# Patient Record
Sex: FEMALE | Race: BLACK OR AFRICAN AMERICAN | NOT HISPANIC OR LATINO | Employment: UNEMPLOYED | URBAN - METROPOLITAN AREA
[De-identification: names, ages, dates, MRNs, and addresses within clinical notes are randomized per-mention and may not be internally consistent; named-entity substitution may affect disease eponyms.]

---

## 2017-01-01 ENCOUNTER — HOSPITAL ENCOUNTER (EMERGENCY)
Facility: HOSPITAL | Age: 27
Discharge: HOME | End: 2017-01-02
Attending: EMERGENCY MEDICINE | Admitting: EMERGENCY MEDICINE
Payer: MEDICARE

## 2017-01-01 VITALS
RESPIRATION RATE: 18 BRPM | HEART RATE: 82 BPM | DIASTOLIC BLOOD PRESSURE: 72 MMHG | OXYGEN SATURATION: 98 % | TEMPERATURE: 99 F | HEIGHT: 63 IN | WEIGHT: 160 LBS | BODY MASS INDEX: 28.35 KG/M2 | SYSTOLIC BLOOD PRESSURE: 160 MMHG

## 2017-01-01 DIAGNOSIS — R10.30 LOWER ABDOMINAL PAIN: Primary | ICD-10-CM

## 2017-01-01 PROCEDURE — 82962 GLUCOSE BLOOD TEST: CPT

## 2017-01-01 PROCEDURE — 99284 EMERGENCY DEPT VISIT MOD MDM: CPT | Mod: ,,, | Performed by: EMERGENCY MEDICINE

## 2017-01-01 PROCEDURE — 99283 EMERGENCY DEPT VISIT LOW MDM: CPT | Mod: 25

## 2017-01-01 PROCEDURE — 25000003 PHARM REV CODE 250: Performed by: EMERGENCY MEDICINE

## 2017-01-01 RX ORDER — ONDANSETRON 4 MG/1
4 TABLET, ORALLY DISINTEGRATING ORAL
Status: COMPLETED | OUTPATIENT
Start: 2017-01-01 | End: 2017-01-01

## 2017-01-01 RX ORDER — OXYCODONE AND ACETAMINOPHEN 5; 325 MG/1; MG/1
1 TABLET ORAL
Status: COMPLETED | OUTPATIENT
Start: 2017-01-01 | End: 2017-01-01

## 2017-01-01 RX ADMIN — ONDANSETRON 4 MG: 4 TABLET, ORALLY DISINTEGRATING ORAL at 11:01

## 2017-01-01 RX ADMIN — OXYCODONE HYDROCHLORIDE AND ACETAMINOPHEN 1 TABLET: 5; 325 TABLET ORAL at 11:01

## 2017-01-01 NOTE — ED AVS SNAPSHOT
[facility]  [address]  [facility] [address]               Marylee Hill   2017 11:19 PM   ED    Description:  Female : 1990   Department:  [facility]           Your Care was Coordinated By:     Provider Role From To    Jacob Graham III, MD Attending Provider 17 [id number] --    Laina Chapa MD Resident 17 [phone] --      Reason for Visit     Abdominal Pain           Diagnoses this Visit        Comments    Lower abdominal pain    -  Primary       ED Disposition     None           To Do List           Follow-up Information     Follow up with [facility].    Specialty:  Emergency Medicine    Why:  As needed, If symptoms worsen    Contact information:    [address]  [facility] [id number] [phone]        Follow up with Keep your follow-up appointment with aleksandr gynecologist on 16 as scheduled.       These Medications        Disp Refills Start End    oxycodone-acetaminophen (PERCOCET) 5-325 mg per tablet 15 tablet 0 2017     Take 1 tablet by mouth every 4 (four) hours as needed for Pain. - Oral    ondansetron (ZOFRAN) 4 MG tablet 15 tablet 0 2017     Take 1 tablet (4 mg total) by mouth every 6 (six) hours as needed for Nausea. - Oral      [facility]     [facility] -  Assistance  Registered nurses in the [facility] provide clinical advisement, health education, appointment booking, and other advisory services.  Call for this free service at [phone].             Medications           Message regarding Medications     Verify the changes and/or additions to your medication regime listed below are the same as discussed with your clinician today.  If any of these changes or additions are incorrect, please notify your healthcare provider.        START taking these NEW medications        Refills    oxycodone-acetaminophen (PERCOCET) 5-325 mg  per tablet 0    Sig: Take 1 tablet by mouth every 4 (four) hours as needed for Pain.    Class: Print    Route: Oral    ondansetron (ZOFRAN) 4 MG tablet 0    Sig: Take 1 tablet (4 mg total) by mouth every 6 (six) hours as needed for Nausea.    Class: Print    Route: Oral      These medications were administered today        Dose Freq    oxycodone-acetaminophen 5-325 mg per tablet 1 tablet 1 tablet ED 1 Time    Sig: Take 1 tablet by mouth ED 1 Time.    Class: Normal    Route: Oral    ondansetron disintegrating tablet 4 mg 4 mg ED 1 Time    Sig: Take 1 tablet (4 mg total) by mouth ED 1 Time.    Class: Normal    Route: Oral      STOP taking these medications     tramadol (ULTRAM) 50 mg tablet Take 1 tablet (50 mg total) by mouth every 6 (six) hours as needed for Pain.    ondansetron (ZOFRAN-ODT) 4 MG TbDL Take 1 tablet (4 mg total) by mouth every 6 (six) hours as needed.           Verify that the below list of medications is an accurate representation of the medications you are currently taking.  If none reported, the list may be blank. If incorrect, please contact your healthcare provider. Carry this list with you in case of emergency.           Current Medications     atorvastatin (LIPITOR) 10 MG tablet Take 10 mg by mouth once daily.    insulin aspart protamine-insulin aspart (NOVOLOG 70/30) 100 unit/mL (70-30) InPn pen Inject 30 Units into the skin 2 (two) times daily before meals.    insulin glargine (LANTUS) 100 unit/mL injection Inject into the skin every evening.    ketorolac (TORADOL) 10 mg tablet Take 1 tablet (10 mg total) by mouth every 6 (six) hours.    LISINOPRIL ORAL Take 10 mg by mouth.     nitrofurantoin, macrocrystal-monohydrate, (MACROBID) 100 MG capsule Take 1 capsule (100 mg total) by mouth 2 (two) times daily.    ondansetron (ZOFRAN) 4 MG tablet Take 1 tablet (4 mg total) by mouth every 6 (six) hours as needed for Nausea.    oxycodone-acetaminophen (PERCOCET) 5-325 mg per tablet Take 1 tablet by  "mouth every 4 (four) hours as needed for Pain.    promethazine (PHENERGAN) 25 MG tablet Take 1 tablet (25 mg total) by mouth every 6 (six) hours as needed for Nausea.           Clinical Reference Information           Your Vitals Were     BP Pulse Temp Resp Height Weight    160/72 82 98.9 °F (37.2 °C) (Oral) 18 5' 3" (1.6 m) 72.6 kg (160 lb)    Last Period SpO2 BMI          12/30/2016 98% 28.34 kg/m2        Allergies as of 1/2/2017        Reactions    Ibuprofen     Vicodin [Hydrocodone-acetaminophen] Hives    Patient states that she can take percocet      Immunizations Administered on Date of Encounter - 1/2/2017     None      ED Micro, Lab, POCT     Start Ordered       Status Ordering Provider    01/02/17 0033 01/02/17 0032  C. trachomatis/N. gonorrhoeae by AMP DNA Cervix  Once      In process     01/02/17 0033 01/02/17 0032  Vaginal Screen Vagina  STAT      In process     01/01/17 2344 01/01/17 2343  POCT glucose  Once      Acknowledged     01/01/17 2336 01/01/17 2336  Urinalysis  STAT      In process     01/01/17 2336 01/01/17 2336  POCT urine pregnancy  Once      Acknowledged       ED Imaging Orders     None        Discharge Instructions         Abdominal Pain    Abdominal pain is pain in the stomach or belly area. Everyone has this pain from time to time. In many cases it goes away on its own. But abdominal pain can sometimes be due to a serious problem, such as appendicitis. So its important to know when to seek help.  Causes of abdominal pain  There are many possible causes of abdominal pain. Common causes in adults include:  · Constipation, diarrhea, or gas  · Stomach acid flowing back up into the esophagus (acid reflux or heartburn)  · Severe acid reflux, called GERD (gastroesophageal reflux disease)  · A sore in the lining of the stomach or small intestine (peptic ulcer)  · Inflammation of the gallbladder, liver, or pancreas  · Gallstones or kidney stones  · Appendicitis   · Intestinal blockage   · An " internal organ pushing through a muscle or other tissue (hernia)  · Urinary tract infections  · In women, menstrual cramps, fibroids, or endometriosis  · Inflammation or infection of the intestines  Diagnosing the cause of abdominal pain  Your healthcare provider will do a physical exam help find the cause of your pain. If needed, tests will be ordered. Belly pain has many possible causes. So it can be hard to find the reason for your pain. Giving details about your pain can help. Tell your provider where and when you feel the pain, and what makes it better or worse. Also let your provider know if you have other symptoms such as:  · Fever  · Tiredness  · Upset stomach (nausea)  · Vomiting  · Changes in bathroom habits  Treating abdominal pain  Some causes of pain need emergency medical treatment right away. These include appendicitis or a bowel blockage. Other problems can be treated with rest, fluids, or medicines. Your healthcare provider can give you specific instructions for treatment or self-care based on what is causing your pain.  If you have vomiting or diarrhea, sip water or other clear fluids. When you are ready to eat solid foods again, start with small amounts of easy-to-digest, low-fat foods. These include apple sauce, toast, or crackers.   When to seek medical care  Call 911 or go to the hospital right away if you:  · Cant pass stool and are vomiting  · Are vomiting blood or have bloody diarrhea or black, tarry diarrhea  · Have chest, neck, or shoulder pain  · Feel like you might pass out  · Have pain in your shoulder blades with nausea  · Have sudden, severe belly pain  · Have new, severe pain unlike any you have felt before  · Have a belly that is rigid, hard, and tender to touch  Call your healthcare provider if you have:  · Pain for more than 5 days  · Bloating for more than 2 days  · Diarrhea for more than 5 days  · A fever of 100.4°F (38.0°C) or higher, or as directed by your provider  · Pain  that gets worse  · Weight loss for no reason  · Continued lack of appetite  · Blood in your stool  How to prevent abdominal pain  Here are some tips to help prevent abdominal pain:  · Eat smaller amounts of food at one time.  · Avoid greasy, fried, or other high-fat foods.  · Avoid foods that give you gas.  · Exercise regularly.  · Drink plenty of fluids.  To help prevent GERD symptoms:  · Quit smoking.  · Reduce alcohol and certain foods that increase stomach acid.  · Avoid aspirin and over-the-counter pain and fever medicines (NSAIDS or nonsteroidal anti-inflammatory drugs), if possible  · Lose extra weight.  · Finish eating at least 2 hours before you go to bed or lie down.  · Raise the head of your bed.  © 2308-2906 [employer]. [address] [place] [address], [place] [address]. All rights reserved. This information is not intended as a substitute for professional medical care. Always follow your healthcare professional's instructions.          [facility] Sign-Up     Activating your [facility] account is as easy as 1-2-3!     1) Visit [email or web address], select Sign Up Now, enter this activation code and your date of birth, then select Next.  [email or web address]  Expires: 2/14/2017 12:47 AM      2) Create a username and password to use when you visit [facility] in the future and select a security question in case you lose your password and select Next.    3) Enter your e-mail address and click Sign Up!    Additional Information  If you have questions, please e-mail [email or web address] or call [phone] to talk to our [facility] staff. Remember, [facility] is NOT to be used for urgent needs. For medical emergencies, dial 911.          [facility] complies with applicable Federal civil rights laws and does not discriminate on the basis of race, color, national origin, age, disability, or sex.        Language Assistance Services     ATTENTION: Language assistance services are available, free of  charge. Please call [phone].      ATENCIÓN: Si habla español, tiene a ellis disposición servicios gratuitos de asistencia lingüística. Llame al [phone].     CHÚ Ý: N?u b?n nói Ti?ng Vi?t, có các d?ch v? h? tr? ngôn ng? mi?n phí dành cho b?n. G?i s? [phone].

## 2017-01-02 LAB
BACTERIA #/AREA URNS AUTO: NORMAL /HPF
BACTERIA GENITAL QL WET PREP: ABNORMAL
BILIRUB UR QL STRIP: NEGATIVE
CLARITY UR REFRACT.AUTO: CLEAR
CLUE CELLS VAG QL WET PREP: ABNORMAL
COLOR UR AUTO: YELLOW
FILAMENT FUNGI VAG WET PREP-#/AREA: ABNORMAL
GLUCOSE UR QL STRIP: NEGATIVE
HGB UR QL STRIP: NEGATIVE
KETONES UR QL STRIP: NEGATIVE
LEUKOCYTE ESTERASE UR QL STRIP: ABNORMAL
MICROSCOPIC COMMENT: NORMAL
NITRITE UR QL STRIP: NEGATIVE
PH UR STRIP: 7 [PH] (ref 5–8)
PROT UR QL STRIP: NEGATIVE
RBC #/AREA URNS AUTO: 1 /HPF (ref 0–4)
SP GR UR STRIP: 1.01 (ref 1–1.03)
SPECIMEN SOURCE: ABNORMAL
SQUAMOUS #/AREA URNS AUTO: 6 /HPF
T VAGINALIS GENITAL QL WET PREP: ABNORMAL
URN SPEC COLLECT METH UR: ABNORMAL
UROBILINOGEN UR STRIP-ACNC: NEGATIVE EU/DL
WBC #/AREA URNS AUTO: 3 /HPF (ref 0–5)
WBC #/AREA VAG WET PREP: ABNORMAL
YEAST GENITAL QL WET PREP: ABNORMAL

## 2017-01-02 PROCEDURE — 87591 N.GONORRHOEAE DNA AMP PROB: CPT

## 2017-01-02 PROCEDURE — 87210 SMEAR WET MOUNT SALINE/INK: CPT

## 2017-01-02 PROCEDURE — 81001 URINALYSIS AUTO W/SCOPE: CPT

## 2017-01-02 RX ORDER — ONDANSETRON 4 MG/1
4 TABLET, FILM COATED ORAL EVERY 6 HOURS PRN
Qty: 15 TABLET | Refills: 0 | Status: SHIPPED | OUTPATIENT
Start: 2017-01-02 | End: 2017-03-24

## 2017-01-02 RX ORDER — OXYCODONE AND ACETAMINOPHEN 5; 325 MG/1; MG/1
1 TABLET ORAL EVERY 4 HOURS PRN
Qty: 15 TABLET | Refills: 0 | Status: SHIPPED | OUTPATIENT
Start: 2017-01-02 | End: 2017-03-24

## 2017-01-02 NOTE — ED NOTES
Adult Physical Assessment  LOC: Marylee Hill, 26 y.o. female verified via two identifiers.  The patient is awake, alert, oriented and speaking appropriately at this time.  APPEARANCE: Patient resting comfortably and appears to be in no acute distress at this time. Patient is clean and well groomed, patient's clothing is properly fastened.  SKIN:The skin is warm and dry, color consistent with ethnicity, patient has normal skin turgor and moist mucus membranes, skin intact, no breakdown or brusing noted.  MUSCULOSKELETAL: Patient moving all extremities well, no obvious swelling or deformities noted.  RESPIRATORY: Airway is open and patent, respirations are spontaneous, patient has a normal effort and rate, no accessory muscle use noted.  CARDIAC: Patient has a normal rate and rhythm, no periphreal edema noted in any extremity, capillary refill < 3 seconds in all extremities  ABDOMEN: Soft and non tender to palpation, no abdominal distention noted. Bowel sounds present in all four quadrants.  NEUROLOGIC: Eyes open spontaneously, behavior appropriate to situation, follows commands, facial expression symmetrical, bilateral hand grasp equal and even, purposeful motor response noted, normal sensation in all extremities when touched with a finger.

## 2017-01-02 NOTE — ED PROVIDER NOTES
Encounter Date: 2017    SCRIBE #1 NOTE: I, Dasha Trammell, am scribing for, and in the presence of,  Dr. Graham. I have scribed the following portions of the note - the Resident attestation.       History     Chief Complaint   Patient presents with    Abdominal Pain     Reports lower abdominal pain that worsens with palpation. Reports N/V.     Review of patient's allergies indicates:   Allergen Reactions    Ibuprofen     Vicodin [hydrocodone-acetaminophen] Hives     Patient states that she can take percocet     HPI Comments: 26 y.o. F with DM, HTN who presents with left lower quadrant abdominal pain. She describes 6 months of intermittent LLQ abdominal pain that she describes as sharp, severe pain that feels like labor pain that is always in the same spot on her left lower abdomen. Pain was somewhat improved until starting her menstrual cycle about a week ago. The pain does occasionally radiate diffusely through the abdomen. Pain worse when she is menstruating, improves after her menstrual cycles though she does continue to have pain after her menses. Pain often associated with nausea and vomiting, though she is currently tolerating po without emesis. No fever/chills, possibly some dysuria. No hematuria, acute bowel changes, or vaginal symptoms    The history is provided by the patient.     Past Medical History   Diagnosis Date    Diabetes mellitus     Hyperlipidemia     Hypertension      No past medical history pertinent negatives.  Past Surgical History   Procedure Laterality Date     section, classic       History reviewed. No pertinent family history.  Social History   Substance Use Topics    Smoking status: Never Smoker    Smokeless tobacco: None    Alcohol use No     Review of Systems   Constitutional: Negative for chills and fever.   HENT: Negative for congestion and sore throat.    Respiratory: Negative for cough and shortness of breath.    Cardiovascular: Negative for chest pain and  leg swelling.   Gastrointestinal: Positive for abdominal pain, nausea and vomiting.   Genitourinary: Negative for flank pain, frequency, hematuria, vaginal discharge and vaginal pain.   Musculoskeletal: Negative for back pain and gait problem.   Skin: Negative for rash.   Neurological: Negative for weakness, light-headedness, numbness and headaches.   Hematological: Does not bruise/bleed easily.       Physical Exam   Initial Vitals   BP Pulse Resp Temp SpO2   01/01/17 2318 01/01/17 2318 01/01/17 2318 01/01/17 2318 01/01/17 2318   160/72 82 18 98.9 °F (37.2 °C) 98 %     Physical Exam    Nursing note and vitals reviewed.  Constitutional: She appears well-developed and well-nourished. She is not diaphoretic. No distress.   Appears comfortable, nontoxic   HENT:   Head: Normocephalic and atraumatic.   Mouth/Throat: Oropharynx is clear and moist.   Eyes: Conjunctivae and EOM are normal. No scleral icterus.   Neck: Normal range of motion. Neck supple. No tracheal deviation present.   Cardiovascular: Normal rate, regular rhythm, normal heart sounds and intact distal pulses. Exam reveals no gallop and no friction rub.    No murmur heard.  Pulmonary/Chest: Breath sounds normal. No respiratory distress. She has no wheezes. She has no rhonchi. She has no rales.   Abdominal: Soft. Bowel sounds are normal. She exhibits mass. She exhibits no distension. There is tenderness. There is no rebound and no guarding.   Small, firm mass palpated to left lower abdomen that is tender to palpation, pt states this is the source of her pain. No rebound, guarding  No CVA tenderness   Genitourinary:   Genitourinary Comments: External exam with no labial tenderness or external lesions. Speculum exam with no bleeding, discharge, or cervical friability.   No cervical motion tenderness, no adnexal tenderness, no adnexal masses or fullness.   Neurological: She is alert and oriented to person, place, and time. She has normal strength. No cranial nerve  deficit.   Skin: Skin is warm and dry.         ED Course   Procedures  Labs Reviewed   URINALYSIS - Abnormal; Notable for the following:        Result Value    Leukocytes, UA Trace (*)     All other components within normal limits   VAGINAL SCREEN - Abnormal; Notable for the following:     Clue Cells, Wet Prep Rare (*)     WBC - Vaginal Screen Rare (*)     Bacteria - Vaginal Screen Occasional (*)     All other components within normal limits   C. TRACHOMATIS/N. GONORRHOEAE BY AMP DNA   URINALYSIS MICROSCOPIC             Medical Decision Making:   History:   Old Medical Records: I decided to obtain old medical records.  Clinical Tests:   Lab Tests: Ordered and Reviewed  Radiological Study: Reviewed       APC / Resident Notes:   26 y.o. F here with acute exacerbation of chronic left lower abdominal pain.  Firm palpable mass on abdominal exam that appears to be source of patient's pain.  CT in October 2016 showed a 2.6cm hypodense lesion centered within the inferior left rectus abdominis muscle that may relate to a hematoma, desmoid, endometrial implant or less likely a malignant process.  Based on relationship to her menstrual cycles, pain possibly due to endometriosis, though scar tissue, tumor also considerations. No concern for SBO as abdomen soft, pt passing gas and having normal bowel movements.  She had labs done at [facility] two days ago that were normal.  Today will evaluate with UA, UPT, pelvic exam. Will treat here with percocet, zofran and reassess. I don't think that repeating her CT would be of much benefit as they recommended f/u evaluation with an MRI, which can be done as an outpatient.   Pt made aware of these CT results and need for outpatient f/u for further advanced imaging. She does have a PCP and gynecologist with whom she agrees to f/u. Anticipate discharge after workup/treatment.  Laina Chapa MD  12:00 AM         Scribe Attestation:   Scribe #1: I performed the above scribed  service and the documentation accurately describes the services I performed. I attest to the accuracy of the note.    Attending Attestation:   Physician Attestation Statement for Resident:  As the supervising MD   Physician Attestation Statement: I have personally seen and examined this patient.   I agree with the above history. -: Left lower quadrant abdominal pain    As the supervising MD I agree with the above PE.    As the supervising MD I agree with the above treatment, course, plan, and disposition.          Physician Attestation for Scribe:  Physician Attestation Statement for Scribe #1: I, Dr. Graham, reviewed documentation, as scribed by Dasha Trammell in my presence, and it is both accurate and complete.                 ED Course     Clinical Impression:   The encounter diagnosis was Lower abdominal pain.          Laina Chapa MD  Resident  01/02/17 0739       Jacob Graham III, MD  01/02/17 5599

## 2017-01-02 NOTE — DISCHARGE INSTRUCTIONS
Abdominal Pain    Abdominal pain is pain in the stomach or belly area. Everyone has this pain from time to time. In many cases it goes away on its own. But abdominal pain can sometimes be due to a serious problem, such as appendicitis. So its important to know when to seek help.  Causes of abdominal pain  There are many possible causes of abdominal pain. Common causes in adults include:  · Constipation, diarrhea, or gas  · Stomach acid flowing back up into the esophagus (acid reflux or heartburn)  · Severe acid reflux, called GERD (gastroesophageal reflux disease)  · A sore in the lining of the stomach or small intestine (peptic ulcer)  · Inflammation of the gallbladder, liver, or pancreas  · Gallstones or kidney stones  · Appendicitis   · Intestinal blockage   · An internal organ pushing through a muscle or other tissue (hernia)  · Urinary tract infections  · In women, menstrual cramps, fibroids, or endometriosis  · Inflammation or infection of the intestines  Diagnosing the cause of abdominal pain  Your healthcare provider will do a physical exam help find the cause of your pain. If needed, tests will be ordered. Belly pain has many possible causes. So it can be hard to find the reason for your pain. Giving details about your pain can help. Tell your provider where and when you feel the pain, and what makes it better or worse. Also let your provider know if you have other symptoms such as:  · Fever  · Tiredness  · Upset stomach (nausea)  · Vomiting  · Changes in bathroom habits  Treating abdominal pain  Some causes of pain need emergency medical treatment right away. These include appendicitis or a bowel blockage. Other problems can be treated with rest, fluids, or medicines. Your healthcare provider can give you specific instructions for treatment or self-care based on what is causing your pain.  If you have vomiting or diarrhea, sip water or other clear fluids. When you are ready to eat solid foods again,  start with small amounts of easy-to-digest, low-fat foods. These include apple sauce, toast, or crackers.   When to seek medical care  Call 911 or go to the hospital right away if you:  · Cant pass stool and are vomiting  · Are vomiting blood or have bloody diarrhea or black, tarry diarrhea  · Have chest, neck, or shoulder pain  · Feel like you might pass out  · Have pain in your shoulder blades with nausea  · Have sudden, severe belly pain  · Have new, severe pain unlike any you have felt before  · Have a belly that is rigid, hard, and tender to touch  Call your healthcare provider if you have:  · Pain for more than 5 days  · Bloating for more than 2 days  · Diarrhea for more than 5 days  · A fever of 100.4°F (38.0°C) or higher, or as directed by your provider  · Pain that gets worse  · Weight loss for no reason  · Continued lack of appetite  · Blood in your stool  How to prevent abdominal pain  Here are some tips to help prevent abdominal pain:  · Eat smaller amounts of food at one time.  · Avoid greasy, fried, or other high-fat foods.  · Avoid foods that give you gas.  · Exercise regularly.  · Drink plenty of fluids.  To help prevent GERD symptoms:  · Quit smoking.  · Reduce alcohol and certain foods that increase stomach acid.  · Avoid aspirin and over-the-counter pain and fever medicines (NSAIDS or nonsteroidal anti-inflammatory drugs), if possible  · Lose extra weight.  · Finish eating at least 2 hours before you go to bed or lie down.  · Raise the head of your bed.  © 5651-0910 [employer]. [address] [place] [address], [place] [address]. All rights reserved. This information is not intended as a substitute for professional medical care. Always follow your healthcare professional's instructions.

## 2017-01-02 NOTE — ED TRIAGE NOTES
Pt presents to the ed with abd pain that has been ongoing for the last six months. Pt reports she was seen as [facility] two days ago with similar symptoms. Pt reports her symptoms are worse around the time of her menstrual cycle and that nothing relieves her symptoms. Pt denies any nausea, vomiting, urinary symptoms or any other symptoms at this time

## 2017-01-03 LAB
C TRACH DNA SPEC QL NAA+PROBE: NEGATIVE
N GONORRHOEA DNA SPEC QL NAA+PROBE: NEGATIVE

## 2017-03-28 PROBLEM — Z79.4 TYPE 2 DIABETES MELLITUS WITHOUT COMPLICATION, WITH LONG-TERM CURRENT USE OF INSULIN: Status: ACTIVE | Noted: 2017-03-28

## 2017-03-28 PROBLEM — J06.9 ACUTE URI: Status: ACTIVE | Noted: 2017-03-28

## 2017-03-28 PROBLEM — E78.00 HYPERCHOLESTEREMIA: Status: ACTIVE | Noted: 2017-03-28

## 2017-03-28 PROBLEM — E11.9 TYPE 2 DIABETES MELLITUS WITHOUT COMPLICATION, WITH LONG-TERM CURRENT USE OF INSULIN: Status: ACTIVE | Noted: 2017-03-28

## 2017-03-28 PROBLEM — I10 ESSENTIAL HYPERTENSION: Status: ACTIVE | Noted: 2017-03-28

## 2017-03-28 PROBLEM — R19.04 LEFT LOWER QUADRANT ABDOMINAL WALL MASS: Status: ACTIVE | Noted: 2017-03-28

## 2017-04-06 PROBLEM — J06.9 ACUTE URI: Status: RESOLVED | Noted: 2017-03-28 | Resolved: 2017-04-06

## 2017-04-24 ENCOUNTER — HOSPITAL ENCOUNTER (OUTPATIENT)
Dept: RADIOLOGY | Facility: HOSPITAL | Age: 27
Discharge: HOME | End: 2017-04-24
Attending: SURGERY
Payer: MEDICARE

## 2017-04-24 DIAGNOSIS — Z79.4 TYPE 2 DIABETES MELLITUS WITHOUT COMPLICATION, WITH LONG-TERM CURRENT USE OF INSULIN: ICD-10-CM

## 2017-04-24 DIAGNOSIS — I10 ESSENTIAL HYPERTENSION: ICD-10-CM

## 2017-04-24 DIAGNOSIS — R19.04 LEFT LOWER QUADRANT ABDOMINAL WALL MASS: ICD-10-CM

## 2017-04-24 DIAGNOSIS — E11.9 TYPE 2 DIABETES MELLITUS WITHOUT COMPLICATION, WITH LONG-TERM CURRENT USE OF INSULIN: ICD-10-CM

## 2017-04-24 PROCEDURE — 76700 US EXAM ABDOM COMPLETE: CPT | Mod: 26,,, | Performed by: RADIOLOGY

## 2017-04-24 PROCEDURE — 76700 US EXAM ABDOM COMPLETE: CPT | Mod: TC

## 2017-05-01 PROBLEM — N80.129 ENDOMETRIOMA: Status: ACTIVE | Noted: 2017-05-01

## 2017-05-04 ENCOUNTER — ANESTHESIA EVENT (OUTPATIENT)
Dept: SURGERY | Facility: HOSPITAL | Age: 27
DRG: 750 | End: 2017-05-04
Payer: MEDICARE

## 2017-05-08 ENCOUNTER — ANESTHESIA (OUTPATIENT)
Dept: SURGERY | Facility: HOSPITAL | Age: 27
DRG: 750 | End: 2017-05-08
Payer: MEDICARE

## 2017-05-08 ENCOUNTER — HOSPITAL ENCOUNTER (INPATIENT)
Facility: HOSPITAL | Age: 27
LOS: 1 days | Discharge: HOME | DRG: 750 | End: 2017-05-08
Attending: SURGERY | Admitting: SURGERY
Payer: MEDICARE

## 2017-05-08 VITALS
DIASTOLIC BLOOD PRESSURE: 88 MMHG | SYSTOLIC BLOOD PRESSURE: 184 MMHG | RESPIRATION RATE: 16 BRPM | HEIGHT: 63 IN | OXYGEN SATURATION: 98 % | TEMPERATURE: 98 F | BODY MASS INDEX: 27.46 KG/M2 | WEIGHT: 155 LBS | HEART RATE: 58 BPM

## 2017-05-08 DIAGNOSIS — R19.04 LEFT LOWER QUADRANT ABDOMINAL WALL MASS: ICD-10-CM

## 2017-05-08 DIAGNOSIS — Z79.4 TYPE 2 DIABETES MELLITUS WITHOUT COMPLICATION, WITH LONG-TERM CURRENT USE OF INSULIN: ICD-10-CM

## 2017-05-08 DIAGNOSIS — R22.2 MASS OF ANTERIOR ABDOMINAL WALL: ICD-10-CM

## 2017-05-08 DIAGNOSIS — I10 ESSENTIAL HYPERTENSION: ICD-10-CM

## 2017-05-08 DIAGNOSIS — E11.9 TYPE 2 DIABETES MELLITUS WITHOUT COMPLICATION, WITH LONG-TERM CURRENT USE OF INSULIN: ICD-10-CM

## 2017-05-08 DIAGNOSIS — E78.00 HYPERCHOLESTEREMIA: ICD-10-CM

## 2017-05-08 DIAGNOSIS — N80.129 ENDOMETRIOMA: ICD-10-CM

## 2017-05-08 LAB
ANION GAP SERPL CALC-SCNC: 10 MMOL/L
ANISOCYTOSIS BLD QL SMEAR: SLIGHT
B-HCG UR QL: NEGATIVE
BASOPHILS # BLD AUTO: 0.04 K/UL
BASOPHILS NFR BLD: 0.7 %
BUN SERPL-MCNC: 6 MG/DL
CALCIUM SERPL-MCNC: 9 MG/DL
CHLORIDE SERPL-SCNC: 106 MMOL/L
CO2 SERPL-SCNC: 22 MMOL/L
CREAT SERPL-MCNC: 0.7 MG/DL
CTP QC/QA: YES
DIFFERENTIAL METHOD: ABNORMAL
EOSINOPHIL # BLD AUTO: 0.1 K/UL
EOSINOPHIL NFR BLD: 2 %
ERYTHROCYTE [DISTWIDTH] IN BLOOD BY AUTOMATED COUNT: 19.6 %
EST. GFR  (AFRICAN AMERICAN): >60 ML/MIN/1.73 M^2
EST. GFR  (NON AFRICAN AMERICAN): >60 ML/MIN/1.73 M^2
GLUCOSE SERPL-MCNC: 145 MG/DL
HCT VFR BLD AUTO: 32.9 %
HGB BLD-MCNC: 9.9 G/DL
HYPOCHROMIA BLD QL SMEAR: ABNORMAL
LYMPHOCYTES # BLD AUTO: 2.3 K/UL
LYMPHOCYTES NFR BLD: 42.6 %
MCH RBC QN AUTO: 20.3 PG
MCHC RBC AUTO-ENTMCNC: 30.1 %
MCV RBC AUTO: 68 FL
MONOCYTES # BLD AUTO: 0.5 K/UL
MONOCYTES NFR BLD: 8.7 %
NEUTROPHILS # BLD AUTO: 2.5 K/UL
NEUTROPHILS NFR BLD: 46 %
OVALOCYTES BLD QL SMEAR: ABNORMAL
PLATELET # BLD AUTO: 280 K/UL
PLATELET BLD QL SMEAR: ABNORMAL
PMV BLD AUTO: 9.1 FL
POCT GLUCOSE: 168 MG/DL (ref 70–110)
POIKILOCYTOSIS BLD QL SMEAR: SLIGHT
POLYCHROMASIA BLD QL SMEAR: ABNORMAL
POTASSIUM SERPL-SCNC: 3.7 MMOL/L
RBC # BLD AUTO: 4.87 M/UL
SODIUM SERPL-SCNC: 138 MMOL/L
WBC # BLD AUTO: 5.4 K/UL

## 2017-05-08 PROCEDURE — 25000003 PHARM REV CODE 250: Performed by: SURGERY

## 2017-05-08 PROCEDURE — 36000707: Performed by: SURGERY

## 2017-05-08 PROCEDURE — 12000002 HC ACUTE/MED SURGE SEMI-PRIVATE ROOM

## 2017-05-08 PROCEDURE — 63600175 PHARM REV CODE 636 W HCPCS: Performed by: ANESTHESIOLOGY

## 2017-05-08 PROCEDURE — 25000003 PHARM REV CODE 250: Performed by: NURSE ANESTHETIST, CERTIFIED REGISTERED

## 2017-05-08 PROCEDURE — 88307 TISSUE EXAM BY PATHOLOGIST: CPT | Mod: 26,,, | Performed by: PATHOLOGY

## 2017-05-08 PROCEDURE — 63600175 PHARM REV CODE 636 W HCPCS: Performed by: SURGERY

## 2017-05-08 PROCEDURE — 37000009 HC ANESTHESIA EA ADD 15 MINS: Performed by: SURGERY

## 2017-05-08 PROCEDURE — 37000008 HC ANESTHESIA 1ST 15 MINUTES: Performed by: SURGERY

## 2017-05-08 PROCEDURE — 71000039 HC RECOVERY, EACH ADD'L HOUR: Performed by: SURGERY

## 2017-05-08 PROCEDURE — 88307 TISSUE EXAM BY PATHOLOGIST: CPT | Performed by: PATHOLOGY

## 2017-05-08 PROCEDURE — 71000016 HC POSTOP RECOV ADDL HR: Performed by: SURGERY

## 2017-05-08 PROCEDURE — 80048 BASIC METABOLIC PNL TOTAL CA: CPT

## 2017-05-08 PROCEDURE — 85025 COMPLETE CBC W/AUTO DIFF WBC: CPT

## 2017-05-08 PROCEDURE — 63600175 PHARM REV CODE 636 W HCPCS: Performed by: NURSE ANESTHETIST, CERTIFIED REGISTERED

## 2017-05-08 PROCEDURE — 81025 URINE PREGNANCY TEST: CPT | Performed by: SURGERY

## 2017-05-08 PROCEDURE — 71000015 HC POSTOP RECOV 1ST HR: Performed by: SURGERY

## 2017-05-08 PROCEDURE — 36000706: Performed by: SURGERY

## 2017-05-08 PROCEDURE — 36415 COLL VENOUS BLD VENIPUNCTURE: CPT

## 2017-05-08 PROCEDURE — 0JB80ZZ EXCISION OF ABDOMEN SUBCUTANEOUS TISSUE AND FASCIA, OPEN APPROACH: ICD-10-PCS | Performed by: SURGERY

## 2017-05-08 PROCEDURE — 71000033 HC RECOVERY, INTIAL HOUR: Performed by: SURGERY

## 2017-05-08 RX ORDER — LISINOPRIL 10 MG/1
10 TABLET ORAL 2 TIMES DAILY
Status: ON HOLD | COMMUNITY
End: 2021-02-01

## 2017-05-08 RX ORDER — PHENYLEPHRINE HYDROCHLORIDE 10 MG/ML
INJECTION INTRAVENOUS
Status: DISCONTINUED | OUTPATIENT
Start: 2017-05-08 | End: 2017-05-08

## 2017-05-08 RX ORDER — SODIUM CHLORIDE, SODIUM LACTATE, POTASSIUM CHLORIDE, CALCIUM CHLORIDE 600; 310; 30; 20 MG/100ML; MG/100ML; MG/100ML; MG/100ML
125 INJECTION, SOLUTION INTRAVENOUS CONTINUOUS
Status: ACTIVE | OUTPATIENT
Start: 2017-05-08 | End: 2017-05-08

## 2017-05-08 RX ORDER — SODIUM CHLORIDE, SODIUM LACTATE, POTASSIUM CHLORIDE, CALCIUM CHLORIDE 600; 310; 30; 20 MG/100ML; MG/100ML; MG/100ML; MG/100ML
INJECTION, SOLUTION INTRAVENOUS CONTINUOUS PRN
Status: DISCONTINUED | OUTPATIENT
Start: 2017-05-08 | End: 2017-05-08

## 2017-05-08 RX ORDER — OXYCODONE AND ACETAMINOPHEN 5; 325 MG/1; MG/1
1 TABLET ORAL EVERY 4 HOURS PRN
Status: DISCONTINUED | OUTPATIENT
Start: 2017-05-08 | End: 2017-05-08 | Stop reason: HOSPADM

## 2017-05-08 RX ORDER — ACETAMINOPHEN 10 MG/ML
INJECTION, SOLUTION INTRAVENOUS
Status: DISCONTINUED | OUTPATIENT
Start: 2017-05-08 | End: 2017-05-08

## 2017-05-08 RX ORDER — LIDOCAINE HYDROCHLORIDE 10 MG/ML
INJECTION, SOLUTION EPIDURAL; INFILTRATION; INTRACAUDAL; PERINEURAL
Status: DISCONTINUED | OUTPATIENT
Start: 2017-05-08 | End: 2017-05-08 | Stop reason: HOSPADM

## 2017-05-08 RX ORDER — ONDANSETRON 2 MG/ML
4 INJECTION INTRAMUSCULAR; INTRAVENOUS DAILY PRN
Status: DISCONTINUED | OUTPATIENT
Start: 2017-05-08 | End: 2017-05-08 | Stop reason: HOSPADM

## 2017-05-08 RX ORDER — BACITRACIN 50000 [IU]/1
INJECTION, POWDER, FOR SOLUTION INTRAMUSCULAR
Status: DISCONTINUED | OUTPATIENT
Start: 2017-05-08 | End: 2017-05-08 | Stop reason: HOSPADM

## 2017-05-08 RX ORDER — ONDANSETRON HYDROCHLORIDE 2 MG/ML
INJECTION, SOLUTION INTRAMUSCULAR; INTRAVENOUS
Status: DISCONTINUED | OUTPATIENT
Start: 2017-05-08 | End: 2017-05-08

## 2017-05-08 RX ORDER — SODIUM CHLORIDE 0.9 % (FLUSH) 0.9 %
3 SYRINGE (ML) INJECTION
Status: DISCONTINUED | OUTPATIENT
Start: 2017-05-08 | End: 2017-05-08 | Stop reason: HOSPADM

## 2017-05-08 RX ORDER — SODIUM CHLORIDE 9 MG/ML
INJECTION, SOLUTION INTRAVENOUS CONTINUOUS
Status: DISCONTINUED | OUTPATIENT
Start: 2017-05-08 | End: 2017-05-08 | Stop reason: HOSPADM

## 2017-05-08 RX ORDER — HYDROMORPHONE HYDROCHLORIDE 2 MG/ML
0.4 INJECTION, SOLUTION INTRAMUSCULAR; INTRAVENOUS; SUBCUTANEOUS EVERY 5 MIN PRN
Status: DISPENSED | OUTPATIENT
Start: 2017-05-08 | End: 2017-05-08

## 2017-05-08 RX ORDER — GLYCOPYRROLATE 0.2 MG/ML
INJECTION INTRAMUSCULAR; INTRAVENOUS
Status: DISCONTINUED | OUTPATIENT
Start: 2017-05-08 | End: 2017-05-08

## 2017-05-08 RX ORDER — PROPOFOL 10 MG/ML
VIAL (ML) INTRAVENOUS
Status: DISCONTINUED | OUTPATIENT
Start: 2017-05-08 | End: 2017-05-08

## 2017-05-08 RX ORDER — SUCCINYLCHOLINE CHLORIDE 20 MG/ML
INJECTION INTRAMUSCULAR; INTRAVENOUS
Status: DISCONTINUED | OUTPATIENT
Start: 2017-05-08 | End: 2017-05-08

## 2017-05-08 RX ORDER — LIDOCAINE HCL/PF 100 MG/5ML
SYRINGE (ML) INTRAVENOUS
Status: DISCONTINUED | OUTPATIENT
Start: 2017-05-08 | End: 2017-05-08

## 2017-05-08 RX ORDER — CEFAZOLIN SODIUM 2 G/50ML
2 SOLUTION INTRAVENOUS
Status: COMPLETED | OUTPATIENT
Start: 2017-05-08 | End: 2017-05-08

## 2017-05-08 RX ORDER — FENTANYL CITRATE 50 UG/ML
INJECTION, SOLUTION INTRAMUSCULAR; INTRAVENOUS
Status: DISCONTINUED | OUTPATIENT
Start: 2017-05-08 | End: 2017-05-08

## 2017-05-08 RX ORDER — NEOSTIGMINE METHYLSULFATE 1 MG/ML
INJECTION, SOLUTION INTRAVENOUS
Status: DISCONTINUED | OUTPATIENT
Start: 2017-05-08 | End: 2017-05-08

## 2017-05-08 RX ORDER — ONDANSETRON 4 MG/1
8 TABLET, ORALLY DISINTEGRATING ORAL EVERY 8 HOURS PRN
Qty: 10 TABLET | Refills: 1 | Status: SHIPPED | OUTPATIENT
Start: 2017-05-08 | End: 2017-05-10

## 2017-05-08 RX ORDER — OXYCODONE HYDROCHLORIDE 5 MG/1
5 TABLET ORAL EVERY 4 HOURS PRN
Qty: 24 TABLET | Refills: 0 | Status: SHIPPED | OUTPATIENT
Start: 2017-05-08 | End: 2017-05-10 | Stop reason: SINTOL

## 2017-05-08 RX ORDER — HYDROMORPHONE HYDROCHLORIDE 2 MG/ML
0.2 INJECTION, SOLUTION INTRAMUSCULAR; INTRAVENOUS; SUBCUTANEOUS EVERY 5 MIN PRN
Status: ACTIVE | OUTPATIENT
Start: 2017-05-08 | End: 2017-05-08

## 2017-05-08 RX ORDER — ROCURONIUM BROMIDE 10 MG/ML
INJECTION, SOLUTION INTRAVENOUS
Status: DISCONTINUED | OUTPATIENT
Start: 2017-05-08 | End: 2017-05-08

## 2017-05-08 RX ORDER — MIDAZOLAM HYDROCHLORIDE 1 MG/ML
INJECTION INTRAMUSCULAR; INTRAVENOUS
Status: DISCONTINUED | OUTPATIENT
Start: 2017-05-08 | End: 2017-05-08

## 2017-05-08 RX ADMIN — HYDROMORPHONE HYDROCHLORIDE 0.4 MG: 2 INJECTION INTRAMUSCULAR; INTRAVENOUS; SUBCUTANEOUS at 12:05

## 2017-05-08 RX ADMIN — MIDAZOLAM HYDROCHLORIDE 2 MG: 1 INJECTION, SOLUTION INTRAMUSCULAR; INTRAVENOUS at 10:05

## 2017-05-08 RX ADMIN — FENTANYL CITRATE 50 MCG: 50 INJECTION, SOLUTION INTRAMUSCULAR; INTRAVENOUS at 11:05

## 2017-05-08 RX ADMIN — FENTANYL CITRATE 25 MCG: 50 INJECTION, SOLUTION INTRAMUSCULAR; INTRAVENOUS at 11:05

## 2017-05-08 RX ADMIN — ONDANSETRON 4 MG: 2 INJECTION, SOLUTION INTRAMUSCULAR; INTRAVENOUS at 11:05

## 2017-05-08 RX ADMIN — FENTANYL CITRATE 50 MCG: 50 INJECTION, SOLUTION INTRAMUSCULAR; INTRAVENOUS at 10:05

## 2017-05-08 RX ADMIN — OXYCODONE AND ACETAMINOPHEN 1 TABLET: 5; 325 TABLET ORAL at 01:05

## 2017-05-08 RX ADMIN — SODIUM CHLORIDE, SODIUM LACTATE, POTASSIUM CHLORIDE, AND CALCIUM CHLORIDE: .6; .31; .03; .02 INJECTION, SOLUTION INTRAVENOUS at 10:05

## 2017-05-08 RX ADMIN — LIDOCAINE HYDROCHLORIDE 80 MG: 20 INJECTION, SOLUTION INTRAVENOUS at 10:05

## 2017-05-08 RX ADMIN — ACETAMINOPHEN 1000 MG: 10 INJECTION, SOLUTION INTRAVENOUS at 10:05

## 2017-05-08 RX ADMIN — FENTANYL CITRATE 150 MCG: 50 INJECTION, SOLUTION INTRAMUSCULAR; INTRAVENOUS at 10:05

## 2017-05-08 RX ADMIN — PHENYLEPHRINE HYDROCHLORIDE 100 MCG: 10 INJECTION INTRAVENOUS at 10:05

## 2017-05-08 RX ADMIN — GLYCOPYRROLATE 0.6 MG: 0.2 INJECTION, SOLUTION INTRAMUSCULAR; INTRAVENOUS at 11:05

## 2017-05-08 RX ADMIN — PROPOFOL 150 MG: 10 INJECTION, EMULSION INTRAVENOUS at 10:05

## 2017-05-08 RX ADMIN — ROCURONIUM BROMIDE 20 MG: 10 INJECTION, SOLUTION INTRAVENOUS at 10:05

## 2017-05-08 RX ADMIN — NEOSTIGMINE METHYLSULFATE 3 MG: 1 INJECTION INTRAVENOUS at 11:05

## 2017-05-08 RX ADMIN — ROCURONIUM BROMIDE 5 MG: 10 INJECTION, SOLUTION INTRAVENOUS at 10:05

## 2017-05-08 RX ADMIN — ONDANSETRON 4 MG: 2 INJECTION INTRAMUSCULAR; INTRAVENOUS at 02:05

## 2017-05-08 RX ADMIN — CEFAZOLIN SODIUM 2 G: 2 SOLUTION INTRAVENOUS at 10:05

## 2017-05-08 RX ADMIN — SUCCINYLCHOLINE CHLORIDE 100 MG: 20 INJECTION, SOLUTION INTRAMUSCULAR; INTRAVENOUS at 10:05

## 2017-05-08 NOTE — IP AVS SNAPSHOT
[facility] [place]  [address]  [place] [address]  Phone: [phone]           Patient Discharge Instructions   Our goal is to set you up for success. This packet includes information on your condition, medications, and your home care.  It will help you care for yourself to prevent having to return to the hospital.     Please ask your nurse if you have any questions.      There are many details to remember when preparing to leave the hospital. Here is what you will need to do:    1. Take your medicine. If you are prescribed medications, review your Medication List on the following pages. You may have new medications to  at the pharmacy and others that you'll need to stop taking. Review the instructions for how and when to take your medications. Talk with your doctor or nurses if you are unsure of what to do.     2. Go to your follow-up appointments. Specific follow-up information is listed in the following pages. Your may be contacted by a nurse or clinical provider about future appointments. Be sure we have all of the phone numbers to reach you. Please contact your provider's office if you are unable to make an appointment.     3. Watch for warning signs. Your doctor or nurse will give you detailed warning signs to watch for and when to call for assistance. These instructions may also include educational information about your condition. If you experience any of warning signs to your health, call your doctor.               ** Verify the list of medication(s) below is accurate and up to date. Carry this with you in case of emergency. If your medications have changed, please notify your healthcare provider.             Medication List      START taking these medications        Additional Info                      ondansetron 4 MG Tbdl   Commonly known as:  ZOFRAN-ODT   Quantity:  10 tablet   Refills:  1   Dose:  8 mg    Instructions:  Take 2 tablets (8 mg total) by mouth every 8 (eight) hours  as needed.     Begin Date    AM    Noon    PM    Bedtime       oxycodone 5 MG immediate release tablet   Commonly known as:  ROXICODONE   Quantity:  24 tablet   Refills:  0   Dose:  5 mg    Instructions:  Take 1 tablet (5 mg total) by mouth every 4 (four) hours as needed for Pain.     Begin Date    AM    Noon    PM    Bedtime         CONTINUE taking these medications        Additional Info                      atorvastatin 10 MG tablet   Commonly known as:  LIPITOR   Refills:  0   Dose:  10 mg    Instructions:  Take 10 mg by mouth once daily.     Begin Date    AM    Noon    PM    Bedtime       insulin aspart protamine-insulin aspart 100 unit/mL (70-30) Inpn pen   Commonly known as:  NovoLOG 70/30   Refills:  0   Dose:  35 Units    Instructions:  Inject 35 Units into the skin 2 (two) times daily before meals.     Begin Date    AM    Noon    PM    Bedtime       lisinopril 10 MG tablet   Refills:  0   Dose:  10 mg    Instructions:  Take 10 mg by mouth 2 (two) times daily.     Begin Date    AM    Noon    PM    Bedtime       ondansetron 8 MG tablet   Commonly known as:  ZOFRAN   Quantity:  20 tablet   Refills:  2   Dose:  8 mg    Instructions:  Take 1 tablet (8 mg total) by mouth every 8 (eight) hours as needed for Nausea.     Begin Date    AM    Noon    PM    Bedtime       oxycodone-acetaminophen 5-325 mg per tablet   Commonly known as:  PERCOCET   Quantity:  10 tablet   Refills:  0   Dose:  1 tablet    Last time this was given:  1 tablet on 5/8/2017  1:22 PM   Instructions:  Take 1 tablet by mouth every 8 (eight) hours as needed for Pain.     Begin Date    AM    Noon    PM    Bedtime         STOP taking these medications     insulin glargine 100 unit/mL injection   Commonly known as:  LANTUS            Where to Get Your Medications      You can get these medications from any pharmacy     Bring a paper prescription for each of these medications     ondansetron 4 MG Tbdl    oxycodone 5 MG immediate release tablet                   Please bring to all follow up appointments:    1. A copy of your discharge instructions.  2. All medicines you are currently taking in their original bottles.  3. Identification and insurance card.    Please arrive 15 minutes ahead of scheduled appointment time.    Please call 24 hours in advance if you must reschedule your appointment and/or time.        Your Scheduled Appointments     May 16, 2017 10:00 AM CDT   Established Patient Visit with [facility] [place] [facility]    [address] [place] [address] [phone]              Follow-up Information     Follow up with Monica Ortega MD In 1 week.    Specialties:  General Surgery, Surgery    Contact information:    [address]  [address]  [place] [address] [phone]          Discharge Instructions     Future Orders    Activity as tolerated     Call MD for:  persistent nausea and vomiting     Call MD for:  redness, tenderness, or signs of infection (pain, swelling, redness, odor or green/yellow discharge around incision site)     Call MD for:  severe uncontrolled pain     Call MD for:  temperature >100.4     Diet general     Questions:    Total calories:      Fat restriction, if any:      Protein restriction, if any:      Na restriction, if any:      Fluid restriction:      Additional restrictions:      Leave dressing on - Keep it clean, dry, and intact until clinic visit     Lifting restrictions         Discharge Instructions       DIET: You may resume your home diet. If nausea is present, increase your diet gradually with fluids and bland foods    ACTIVITY LEVEL: If you have received sedation or an anesthetic, you may feel sleepy for several hours. Rest until you are more awake. Gradually resume your normal activities    Medications: Pain medication should be taken only if needed and as directed. If antibiotics are prescribed, the medication should be taken until completed.  You will be given an updated list of you medications.    No driving, alcoholic beverages or signing legal documents for next 24 hours or while taking pain medication.       CALL THE DOCTOR:    For any obvious bleeding (some dried blood over the incision is normal).      Redness, swelling, foul smell around incision or fever over 101.   Shortness of breath, Coughing up Bloody sputum, Pains or Swelling in your Calves .   Persistent pain or nausea not relieved by medication.    If any unusual problems or difficulties occur contact your doctor. If you cannot contact your doctor but feel your signs and symptoms warrant a physicians attention return to the emergency room.            Keep Abdominal Dressing Clean Dry and Intact until clinic visit with Dr. Ortega in one week. Be carefull not to get it soiled.    May shower with dressing on. Pat dry being careful not to dislodge the dressing.    Don't lift anything heavy or do anything strenuous until cleared by Dr. Ortega.          Acetaminophen; Oxycodone tablets  What is this medicine?  ACETAMINOPHEN; OXYCODONE (a set a NAV tereso fen; ox i KOE done) is a pain reliever. It is used to treat moderate to severe pain.  How should I use this medicine?  Take this medicine by mouth with a full glass of water. Follow the directions on the prescription label. You can take it with or without food. If it upsets your stomach, take it with food. Take your medicine at regular intervals. Do not take it more often than directed.  A special MedGuide will be given to you by the pharmacist with each prescription and refill. Be sure to read this information carefully each time.  Talk to your pediatrician regarding the use of this medicine in children. Special care may be needed.  What side effects may I notice from receiving this medicine?  Side effects that you should report to your doctor or health care professional as soon as possible:  · allergic reactions like skin rash, itching or  hives, swelling of the face, lips, or tongue  · breathing problems  · confusion  · redness, blistering, peeling or loosening of the skin, including inside the mouth  · signs and symptoms of liver injury like dark yellow or brown urine; general ill feeling or flu-like symptoms; light-colored stools; loss of appetite; nausea; right upper belly pain; unusually weak or tired; yellowing of the eyes or skin  · signs and symptoms of low blood pressure like dizziness; feeling faint or lightheaded, falls; unusually weak or tired  · trouble passing urine or change in the amount of urine  Side effects that usually do not require medical attention (report to your doctor or health care professional if they continue or are bothersome):  · constipation  · dry mouth  · nausea, vomiting  · tiredness  What may interact with this medicine?  This medicine may interact with the following medications:  · alcohol  · antihistamines for allergy, cough and cold  · antiviral medicines for HIV or AIDS  · atropine  · certain antibiotics like clarithromycin, erythromycin, linezolid, rifampin  · certain medicines for anxiety or sleep  · certain medicines for bladder problems like oxybutynin, tolterodine  · certain medicines for depression like amitriptyline, fluoxetine, sertraline  · certain medicines for fungal infections like ketoconazole, itraconazole, voriconazole  · certain medicines for migraine headache like almotriptan, eletriptan, frovatriptan, naratriptan, rizatriptan, sumatriptan, zolmitriptan  · certain medicines for nausea or vomiting like dolasetron, ondansetron, palonosetron  · certain medicines for Parkinson's disease like benztropine, trihexyphenidyl  · certain medicines for seizures like phenobarbital, phenytoin, primidone  · certain medicines for stomach problems like dicyclomine, hyoscyamine  · certain medicines for travel sickness like scopolamine  · diuretics  · general anesthetics like halothane, isoflurane, methoxyflurane,  propofol  · ipratropium  · local anesthetics like lidocaine, pramoxine, tetracaine  · MAOIs like Carbex, Eldepryl, Marplan, Nardil, and Parnate  · medicines that relax muscles for surgery  · methylene blue  · nilotinib  · other medicines with acetaminophen  · other narcotic medicines for pain or cough  · phenothiazines like chlorpromazine, mesoridazine, prochlorperazine, thioridazine  What if I miss a dose?  If you miss a dose, take it as soon as you can. If it is almost time for your next dose, take only that dose. Do not take double or extra doses.  Where should I keep my medicine?  Keep out of the reach of children. This medicine can be abused. Keep your medicine in a safe place to protect it from theft. Do not share this medicine with anyone. Selling or giving away this medicine is dangerous and against the law.  This medicine may cause accidental overdose and death if it taken by other adults, children, or pets. Mix any unused medicine with a substance like cat litter or coffee grounds. Then throw the medicine away in a sealed container like a sealed bag or a coffee can with a lid. Do not use the medicine after the expiration date.  Store at room temperature between 20 and 25 degrees C (68 and 77 degrees F).  What should I tell my health care provider before I take this medicine?  They need to know if you have any of these conditions:  · brain tumor  · Crohn's disease, inflammatory bowel disease, or ulcerative colitis  · drug abuse or addiction  · head injury  · heart or circulation problems  · if you often drink alcohol  · kidney disease or problems going to the bathroom  · liver disease  · lung disease, asthma, or breathing problems  · an unusual or allergic reaction to acetaminophen, oxycodone, other opioid analgesics, other medicines, foods, dyes, or preservatives  · pregnant or trying to get pregnant  · breast-feeding  What should I watch for while using this medicine?  Tell your doctor or health care  professional if your pain does not go away, if it gets worse, or if you have new or a different type of pain. You may develop tolerance to the medicine. Tolerance means that you will need a higher dose of the medication for pain relief. Tolerance is normal and is expected if you take this medicine for a long time.  Do not suddenly stop taking your medicine because you may develop a severe reaction. Your body becomes used to the medicine. This does NOT mean you are addicted. Addiction is a behavior related to getting and using a drug for a non-medical reason. If you have pain, you have a medical reason to take pain medicine. Your doctor will tell you how much medicine to take. If your doctor wants you to stop the medicine, the dose will be slowly lowered over time to avoid any side effects.  There are different types of narcotic medicines (opiates). If you take more than one type at the same time or if you are taking another medicine that also causes drowsiness, you may have more side effects. Give your health care provider a list of all medicines you use. Your doctor will tell you how much medicine to take. Do not take more medicine than directed. Call emergency for help if you have problems breathing or unusual sleepiness.  Do not take other medicines that contain acetaminophen with this medicine. Always read labels carefully. If you have questions, ask your doctor or pharmacist.  If you take too much acetaminophen get medical help right away. Too much acetaminophen can be very dangerous and cause liver damage. Even if you do not have symptoms, it is important to get help right away.  You may get drowsy or dizzy. Do not drive, use machinery, or do anything that needs mental alertness until you know how this medicine affects you. Do not stand or sit up quickly, especially if you are an older patient. This reduces the risk of dizzy or fainting spells. Alcohol may interfere with the effect of this medicine. Avoid  alcoholic drinks.  The medicine will cause constipation. Try to have a bowel movement at least every 2 to 3 days. If you do not have a bowel movement for 3 days, call your doctor or health care professional.  Your mouth may get dry. Chewing sugarless gum or sucking hard candy, and drinking plenty or water may help. Contact your doctor if the problem does not go away or is severe.  Date Last Reviewed:   NOTE:This sheet is a summary. It may not cover all possible information. If you have questions about this medicine, talk to your doctor, pharmacist, or health care provider. Copyright© 2016 [employer]          Ondansetron tablets  What is this medicine?  ONDANSETRON (on DAN se mango) is used to treat nausea and vomiting caused by chemotherapy. It is also used to prevent or treat nausea and vomiting after surgery.  How should I use this medicine?  Take this medicine by mouth with a glass of water. Follow the directions on your prescription label. Take your doses at regular intervals. Do not take your medicine more often than directed.  Talk to your pediatrician regarding the use of this medicine in children. Special care may be needed.  What side effects may I notice from receiving this medicine?  Side effects that you should report to your doctor or health care professional as soon as possible:  · allergic reactions like skin rash, itching or hives, swelling of the face, lips or tongue  · breathing problems  · confusion  · dizziness  · fast or irregular heartbeat  · feeling faint or lightheaded, falls  · fever and chills  · loss of balance or coordination  · seizures  · sweating  · swelling of the hands or feet  · tightness in the chest  · tremors  · unusually weak or tired  Side effects that usually do not require medical attention (report to your doctor or health care professional if they continue or are bothersome):  · constipation or diarrhea  · headache  What may interact with this medicine?  Do not take this  medicine with any of the following medications:  · apomorphine  · certain medicines for fungal infections like fluconazole, itraconazole, ketoconazole, posaconazole, voriconazole  · cisapride  · dofetilide  · dronedarone  · pimozide  · thioridazine  · ziprasidone  This medicine may also interact with the following medications:  · carbamazepine  · certain medicines for depression, anxiety, or psychotic disturbances  · fentanyl  · linezolid  · MAOIs like Carbex, Eldepryl, Marplan, Nardil, and Parnate  · methylene blue (injected into a vein)  · other medicines that prolong the QT interval (cause an abnormal heart rhythm)  · phenytoin  · rifampicin  · tramadol  What if I miss a dose?  If you miss a dose, take it as soon as you can. If it is almost time for your next dose, take only that dose. Do not take double or extra doses.  Where should I keep my medicine?  Keep out of the reach of children.  Store between 2 and 30 degrees C (36 and 86 degrees F). Throw away any unused medicine after the expiration date.  What should I tell my health care provider before I take this medicine?  They need to know if you have any of these conditions:  · heart disease  · history of irregular heartbeat  · liver disease  · low levels of magnesium or potassium in the blood  · an unusual or allergic reaction to ondansetron, granisetron, other medicines, foods, dyes, or preservatives  · pregnant or trying to get pregnant  · breast-feeding  What should I watch for while using this medicine?  Check with your doctor or health care professional right away if you have any sign of an allergic reaction.  Date Last Reviewed:   NOTE:This sheet is a summary. It may not cover all possible information. If you have questions about this medicine, talk to your doctor, pharmacist, or health care provider. Copyright© 2016 [employer]              Primary Diagnosis     Your primary diagnosis was:  Mass Of Anterior Abdominal Wall      Admission Information   "   Date & Time Provider Department CSN    5/8/2017  8:47 AM Monica Ortega MD [facility] [id number]      Care Providers     Provider Role Specialty Primary office phone    Monica Ortega MD Attending Provider General Surgery [phone]    Ximena Sheikh MD Consulting Physician  Anesthesiology [phone]    Monica Ortega MD Surgeon  General Surgery [phone]      Your Vitals Were     BP Pulse Temp Resp Height Weight    169/88 62 98.4 °F (36.9 °C) (Oral) 16 5' 3" (1.6 m) 70.3 kg (155 lb)    Last Period SpO2 BMI          04/13/2017 (Approximate) 99% 27.46 kg/m2        Recent Lab Values     No lab values to display.      Pending Labs     Order Current Status    Specimen to Pathology - Surgery In process      Allergies as of 5/8/2017        Reactions    Ibuprofen     Vicodin [Hydrocodone-acetaminophen] Hives    Patient states that she can take percocet      [facility]     [facility] Nurse Care Line - 24/7 Assistance  Unless otherwise directed by your provider, please contact [facility], our nurse care line that is available for 24/7 assistance.     Registered nurses in the [facility] provide clinical advisement, health education, appointment booking, and other advisory services.  Call for this free service at [phone].        Advance Directives     An advance directive is a document which, in the event you are no longer able to make decisions for yourself, tells your healthcare team what kind of treatment you do or do not want to receive, or who you would like to make those decisions for you.  If you do not currently have an advance directive, [facility] encourages you to create one.  For more information call:  [phone]), [phone] ([phone]),  or log on to [email or web address].        Language Assistance Services     ATTENTION: Language assistance services are available, free of charge. Please call [phone].  "     ATENCIÓN: Si habla marii, tiene a ellis disposición servicios gratuitos de asistencia lingüística. Llame al [phone].     [facility] Ý: N?u b?n nói Ti?ng Vi?t, có các d?ch v? h? tr? ngôn ng? mi?n phí dành cho b?n. G?i s? [phone].        Diabetes Discharge Instructions                                   [facility] Sign-Up     Activating your [facility] account is as easy as 1-2-3!     1) Visit [email or web address], select Sign Up Now, enter this activation code and your date of birth, then select Next.  [email or web address]  Expires: 5/8/2017  5:25 PM      2) Create a username and password to use when you visit [facility] in the future and select a security question in case you lose your password and select Next.    3) Enter your e-mail address and click Sign Up!    Additional Information  If you have questions, please e-mail [email or web address] or call [phone] to talk to our [facility] staff. Remember, [facility] is NOT to be used for urgent needs. For medical emergencies, dial 911.          [facility] complies with applicable Federal civil rights laws and does not discriminate on the basis of race, color, national origin, age, disability, or sex.

## 2017-05-08 NOTE — DISCHARGE INSTRUCTIONS
DIET: You may resume your home diet. If nausea is present, increase your diet gradually with fluids and bland foods    ACTIVITY LEVEL: If you have received sedation or an anesthetic, you may feel sleepy for several hours. Rest until you are more awake. Gradually resume your normal activities    Medications: Pain medication should be taken only if needed and as directed. If antibiotics are prescribed, the medication should be taken until completed. You will be given an updated list of you medications.    No driving, alcoholic beverages or signing legal documents for next 24 hours or while taking pain medication.       CALL THE DOCTOR:    For any obvious bleeding (some dried blood over the incision is normal).      Redness, swelling, foul smell around incision or fever over 101.   Shortness of breath, Coughing up Bloody sputum, Pains or Swelling in your Calves .   Persistent pain or nausea not relieved by medication.    If any unusual problems or difficulties occur contact your doctor. If you cannot contact your doctor but feel your signs and symptoms warrant a physicians attention return to the emergency room.            Keep Abdominal Dressing Clean Dry and Intact until clinic visit with Dr. Ortega in one week. Be carefull not to get it soiled.    May shower with dressing on. Pat dry being careful not to dislodge the dressing.    Don't lift anything heavy or do anything strenuous until cleared by Dr. Ortega.          Acetaminophen; Oxycodone tablets  What is this medicine?  ACETAMINOPHEN; OXYCODONE (a set a NAV tereso fen; ox i KOE done) is a pain reliever. It is used to treat moderate to severe pain.  How should I use this medicine?  Take this medicine by mouth with a full glass of water. Follow the directions on the prescription label. You can take it with or without food. If it upsets your stomach, take it with food. Take your medicine at regular intervals. Do not take it more often than directed.  A special  MedGuide will be given to you by the pharmacist with each prescription and refill. Be sure to read this information carefully each time.  Talk to your pediatrician regarding the use of this medicine in children. Special care may be needed.  What side effects may I notice from receiving this medicine?  Side effects that you should report to your doctor or health care professional as soon as possible:  · allergic reactions like skin rash, itching or hives, swelling of the face, lips, or tongue  · breathing problems  · confusion  · redness, blistering, peeling or loosening of the skin, including inside the mouth  · signs and symptoms of liver injury like dark yellow or brown urine; general ill feeling or flu-like symptoms; light-colored stools; loss of appetite; nausea; right upper belly pain; unusually weak or tired; yellowing of the eyes or skin  · signs and symptoms of low blood pressure like dizziness; feeling faint or lightheaded, falls; unusually weak or tired  · trouble passing urine or change in the amount of urine  Side effects that usually do not require medical attention (report to your doctor or health care professional if they continue or are bothersome):  · constipation  · dry mouth  · nausea, vomiting  · tiredness  What may interact with this medicine?  This medicine may interact with the following medications:  · alcohol  · antihistamines for allergy, cough and cold  · antiviral medicines for HIV or AIDS  · atropine  · certain antibiotics like clarithromycin, erythromycin, linezolid, rifampin  · certain medicines for anxiety or sleep  · certain medicines for bladder problems like oxybutynin, tolterodine  · certain medicines for depression like amitriptyline, fluoxetine, sertraline  · certain medicines for fungal infections like ketoconazole, itraconazole, voriconazole  · certain medicines for migraine headache like almotriptan, eletriptan, frovatriptan, naratriptan, rizatriptan, sumatriptan,  zolmitriptan  · certain medicines for nausea or vomiting like dolasetron, ondansetron, palonosetron  · certain medicines for Parkinson's disease like benztropine, trihexyphenidyl  · certain medicines for seizures like phenobarbital, phenytoin, primidone  · certain medicines for stomach problems like dicyclomine, hyoscyamine  · certain medicines for travel sickness like scopolamine  · diuretics  · general anesthetics like halothane, isoflurane, methoxyflurane, propofol  · ipratropium  · local anesthetics like lidocaine, pramoxine, tetracaine  · MAOIs like Carbex, Eldepryl, Marplan, Nardil, and Parnate  · medicines that relax muscles for surgery  · methylene blue  · nilotinib  · other medicines with acetaminophen  · other narcotic medicines for pain or cough  · phenothiazines like chlorpromazine, mesoridazine, prochlorperazine, thioridazine  What if I miss a dose?  If you miss a dose, take it as soon as you can. If it is almost time for your next dose, take only that dose. Do not take double or extra doses.  Where should I keep my medicine?  Keep out of the reach of children. This medicine can be abused. Keep your medicine in a safe place to protect it from theft. Do not share this medicine with anyone. Selling or giving away this medicine is dangerous and against the law.  This medicine may cause accidental overdose and death if it taken by other adults, children, or pets. Mix any unused medicine with a substance like cat litter or coffee grounds. Then throw the medicine away in a sealed container like a sealed bag or a coffee can with a lid. Do not use the medicine after the expiration date.  Store at room temperature between 20 and 25 degrees C (68 and 77 degrees F).  What should I tell my health care provider before I take this medicine?  They need to know if you have any of these conditions:  · brain tumor  · Crohn's disease, inflammatory bowel disease, or ulcerative colitis  · drug abuse or addiction  · head  injury  · heart or circulation problems  · if you often drink alcohol  · kidney disease or problems going to the bathroom  · liver disease  · lung disease, asthma, or breathing problems  · an unusual or allergic reaction to acetaminophen, oxycodone, other opioid analgesics, other medicines, foods, dyes, or preservatives  · pregnant or trying to get pregnant  · breast-feeding  What should I watch for while using this medicine?  Tell your doctor or health care professional if your pain does not go away, if it gets worse, or if you have new or a different type of pain. You may develop tolerance to the medicine. Tolerance means that you will need a higher dose of the medication for pain relief. Tolerance is normal and is expected if you take this medicine for a long time.  Do not suddenly stop taking your medicine because you may develop a severe reaction. Your body becomes used to the medicine. This does NOT mean you are addicted. Addiction is a behavior related to getting and using a drug for a non-medical reason. If you have pain, you have a medical reason to take pain medicine. Your doctor will tell you how much medicine to take. If your doctor wants you to stop the medicine, the dose will be slowly lowered over time to avoid any side effects.  There are different types of narcotic medicines (opiates). If you take more than one type at the same time or if you are taking another medicine that also causes drowsiness, you may have more side effects. Give your health care provider a list of all medicines you use. Your doctor will tell you how much medicine to take. Do not take more medicine than directed. Call emergency for help if you have problems breathing or unusual sleepiness.  Do not take other medicines that contain acetaminophen with this medicine. Always read labels carefully. If you have questions, ask your doctor or pharmacist.  If you take too much acetaminophen get medical help right away. Too much  acetaminophen can be very dangerous and cause liver damage. Even if you do not have symptoms, it is important to get help right away.  You may get drowsy or dizzy. Do not drive, use machinery, or do anything that needs mental alertness until you know how this medicine affects you. Do not stand or sit up quickly, especially if you are an older patient. This reduces the risk of dizzy or fainting spells. Alcohol may interfere with the effect of this medicine. Avoid alcoholic drinks.  The medicine will cause constipation. Try to have a bowel movement at least every 2 to 3 days. If you do not have a bowel movement for 3 days, call your doctor or health care professional.  Your mouth may get dry. Chewing sugarless gum or sucking hard candy, and drinking plenty or water may help. Contact your doctor if the problem does not go away or is severe.  Date Last Reviewed:   NOTE:This sheet is a summary. It may not cover all possible information. If you have questions about this medicine, talk to your doctor, pharmacist, or health care provider. Copyright© 2016 [employer]          Ondansetron tablets  What is this medicine?  ONDANSETRON (on DAN se mango) is used to treat nausea and vomiting caused by chemotherapy. It is also used to prevent or treat nausea and vomiting after surgery.  How should I use this medicine?  Take this medicine by mouth with a glass of water. Follow the directions on your prescription label. Take your doses at regular intervals. Do not take your medicine more often than directed.  Talk to your pediatrician regarding the use of this medicine in children. Special care may be needed.  What side effects may I notice from receiving this medicine?  Side effects that you should report to your doctor or health care professional as soon as possible:  · allergic reactions like skin rash, itching or hives, swelling of the face, lips or tongue  · breathing problems  · confusion  · dizziness  · fast or irregular  heartbeat  · feeling faint or lightheaded, falls  · fever and chills  · loss of balance or coordination  · seizures  · sweating  · swelling of the hands or feet  · tightness in the chest  · tremors  · unusually weak or tired  Side effects that usually do not require medical attention (report to your doctor or health care professional if they continue or are bothersome):  · constipation or diarrhea  · headache  What may interact with this medicine?  Do not take this medicine with any of the following medications:  · apomorphine  · certain medicines for fungal infections like fluconazole, itraconazole, ketoconazole, posaconazole, voriconazole  · cisapride  · dofetilide  · dronedarone  · pimozide  · thioridazine  · ziprasidone  This medicine may also interact with the following medications:  · carbamazepine  · certain medicines for depression, anxiety, or psychotic disturbances  · fentanyl  · linezolid  · MAOIs like Carbex, Eldepryl, Marplan, Nardil, and Parnate  · methylene blue (injected into a vein)  · other medicines that prolong the QT interval (cause an abnormal heart rhythm)  · phenytoin  · rifampicin  · tramadol  What if I miss a dose?  If you miss a dose, take it as soon as you can. If it is almost time for your next dose, take only that dose. Do not take double or extra doses.  Where should I keep my medicine?  Keep out of the reach of children.  Store between 2 and 30 degrees C (36 and 86 degrees F). Throw away any unused medicine after the expiration date.  What should I tell my health care provider before I take this medicine?  They need to know if you have any of these conditions:  · heart disease  · history of irregular heartbeat  · liver disease  · low levels of magnesium or potassium in the blood  · an unusual or allergic reaction to ondansetron, granisetron, other medicines, foods, dyes, or preservatives  · pregnant or trying to get pregnant  · breast-feeding  What should I watch for while using this  medicine?  Check with your doctor or health care professional right away if you have any sign of an allergic reaction.  Date Last Reviewed:   NOTE:This sheet is a summary. It may not cover all possible information. If you have questions about this medicine, talk to your doctor, pharmacist, or health care provider. Copyright© 2016 [employer]

## 2017-05-08 NOTE — OP NOTE
Operative Note       Surgery Date: 5/8/2017     Surgeon(s) and Role:     * Monica Ortega MD - Primary    Pre-op Diagnosis:  Endometrioma [N80.9]  Hypercholesteremia [E78.00]  Essential hypertension [I10]  Left lower quadrant abdominal wall mass [R19.04]  Type 2 diabetes mellitus without complication, with long-term current use of insulin [E11.9, Z79.4]    Post-op Diagnosis: Post-Op Diagnosis Codes:     * Endometrioma [N80.9]     * Hypercholesteremia [E78.00]     * Essential hypertension [I10]     * Left lower quadrant abdominal wall mass [R19.04]     * Type 2 diabetes mellitus without complication, with long-term current use of insulin [E11.9, Z79.4]    Procedure(s) (LRB):  EXCISION-MASS ABDOMEN (N/A)    Anesthesia: General    Procedure in Detail/Findings:  dictated    Estimated Blood Loss: 100 mL           Specimens     Start     Ordered    05/08/17 1100  Specimen to Pathology - Surgery  Once      05/08/17 1101        Implants: * No implants in log *           Disposition: PACU - hemodynamically stable.           Condition: Good    Attestation:  I performed the procedure.           Discharge Note    Admit Date: 5/8/2017    Attending Physician: Monica Ortega MD     Discharge Physician: Monica Ortega MD    Final Diagnosis: Post-Op Diagnosis Codes:     * Endometrioma [N80.9]     * Hypercholesteremia [E78.00]     * Essential hypertension [I10]     * Left lower quadrant abdominal wall mass [R19.04]     * Type 2 diabetes mellitus without complication, with long-term current use of insulin [E11.9, Z79.4]    Disposition: Home or Self Care       Patient Instructions:   Current Discharge Medication List      START taking these medications    Details   ondansetron (ZOFRAN-ODT) 4 MG TbDL Take 2 tablets (8 mg total) by mouth every 8 (eight) hours as needed.  Qty: 10 tablet, Refills: 1      oxycodone (ROXICODONE) 5 MG immediate release tablet Take 1 tablet (5 mg total) by mouth every 4 (four) hours as needed  for Pain.  Qty: 24 tablet, Refills: 0         CONTINUE these medications which have NOT CHANGED    Details   atorvastatin (LIPITOR) 10 MG tablet Take 10 mg by mouth once daily.      lisinopril 10 MG tablet Take 10 mg by mouth 2 (two) times daily.      ondansetron (ZOFRAN) 8 MG tablet Take 1 tablet (8 mg total) by mouth every 8 (eight) hours as needed for Nausea.  Qty: 20 tablet, Refills: 2      oxycodone-acetaminophen (PERCOCET) 5-325 mg per tablet Take 1 tablet by mouth every 8 (eight) hours as needed for Pain.  Qty: 10 tablet, Refills: 0      insulin aspart protamine-insulin aspart (NOVOLOG 70/30) 100 unit/mL (70-30) InPn pen Inject 35 Units into the skin 2 (two) times daily before meals.          STOP taking these medications       insulin glargine (LANTUS) 100 unit/mL injection Comments:   Reason for Stopping:               Discharge Procedure Orders (must include Diet, Follow-up, Activity)    Discharge Procedure Orders (must include Diet, Follow-up, Activity)  Diet general     Activity as tolerated     Keep surgical extremity elevated     Lifting restrictions     Call MD for:  temperature >100.4     Call MD for:  persistent nausea and vomiting     Call MD for:  severe uncontrolled pain     Call MD for:  redness, tenderness, or signs of infection (pain, swelling, redness, odor or green/yellow discharge around incision site)     Leave dressing on - Keep it clean, dry, and intact until clinic visit          Discharge Date: No discharge date for patient encounter.

## 2017-05-08 NOTE — TRANSFER OF CARE
"Anesthesia Transfer of Care Note    Patient: Marylee Hill    Procedure(s) Performed: Procedure(s) (LRB):  EXCISION-MASS ABDOMEN (N/A)    Patient location: PACU    Transport from OR: Transported from OR on 6-10 L/min O2 by face mask with adequate spontaneous ventilation    Post pain: adequate analgesia    Post assessment: no apparent anesthetic complications and tolerated procedure well    Post vital signs: stable    Level of consciousness: awake and alert    Nausea/Vomiting: no nausea/vomiting    Complications: none    Transfer of care protocol was followed      Last vitals:   Visit Vitals    BP (!) 152/69    Pulse 77    Temp 36.6 °C (97.8 °F) (Oral)    Resp 18    Ht 5' 3" (1.6 m)    Wt 70.3 kg (155 lb)    LMP 04/13/2017 (Approximate)    SpO2 99%    Breastfeeding No    BMI 27.46 kg/m2     "

## 2017-05-08 NOTE — H&P
History of Present Illness:  Patient is a 26 y.o. female presents with Painful tender swelling getting bigger in size left lower quadrant associated with periods          Chief Complaint   Patient presents with    Results       Pt's had US Abdomen Complete done on 17, shows abdominal wall mass               Review of patient's allergies indicates:   Allergen Reactions    Ibuprofen      Vicodin [hydrocodone-acetaminophen] Hives       Patient states that she can take percocet          Current Medications    Current Outpatient Prescriptions   Medication Sig Dispense Refill    atorvastatin (LIPITOR) 10 MG tablet Take 10 mg by mouth once daily.        insulin aspart protamine-insulin aspart (NOVOLOG 70/30) 100 unit/mL (70-30) InPn pen Inject 30 Units into the skin 2 (two) times daily before meals.        insulin glargine (LANTUS) 100 unit/mL injection Inject into the skin every evening.        oxycodone-acetaminophen (PERCOCET) 5-325 mg per tablet Take 1 tablet by mouth every 8 (eight) hours as needed for Pain. 10 tablet 0      No current facility-administered medications for this visit.                  Past Medical History:   Diagnosis Date    Diabetes mellitus      Hyperlipidemia      Hypertension              Past Surgical History:   Procedure Laterality Date     SECTION, CLASSIC          History reviewed. No pertinent family history.       Social History   Substance Use Topics    Smoking status: Never Smoker    Smokeless tobacco: None    Alcohol use No         Review of Systems:     Review of Systems   Constitutional: Negative for chills and fever.   HENT: Negative.   Eyes: Negative.   Respiratory: Negative.   Cardiovascular: Negative.   Gastrointestinal: Positive for abdominal pain and nausea.   Endocrine: Negative.   Genitourinary: Negative.   Musculoskeletal: Negative.   Skin: Negative.   Allergic/Immunologic: Negative.   Neurological: Negative.   Hematological: Negative.  "  Psychiatric/Behavioral: Negative.         OBJECTIVE:      Vital Signs (Most Recent)  Pulse: 91 (05/01/17 1332)  Resp: 17 (05/01/17 1332)  BP: 127/80 (05/01/17 1332)  5' 3" (1.6 m)  70.3 kg (155 lb)      Physical Exam:     Physical Exam   Constitutional: She is oriented to person, place, and time. She appears well-developed and well-nourished.   HENT:   Head: Normocephalic.   Eyes: EOM are normal. Pupils are equal, round, and reactive to light.   Neck: Normal range of motion. Neck supple.   Cardiovascular: Normal rate, regular rhythm, normal heart sounds and intact distal pulses. Exam reveals no gallop and no friction rub.   No murmur heard.  Pulmonary/Chest: Effort normal and breath sounds normal. No respiratory distress. She has no wheezes. She has no rales. She exhibits no tenderness.   Abdominal: Soft. Bowel sounds are normal. She exhibits mass. There is tenderness.       Musculoskeletal: Normal range of motion.   Neurological: She is alert and oriented to person, place, and time.   Skin: Skin is warm and dry.         Laboratory        Diagnostic Results:        ASSESSMENT/PLAN:      endometrioma abdominal wall     PLAN:Plan       Excision under general anaesthesia today                        "

## 2017-05-08 NOTE — ANESTHESIA PREPROCEDURE EVALUATION
2017  Marylee Hill is a 26 y.o., female w LLQ abdominal wall mass for excision.    PRIOR ANES   none in Epic 2017    ANES-RELATED MED/SURG  HTN  DM II  HLD  Still having periods    Past Surgical History:   Procedure Laterality Date     SECTION, CLASSIC       ALLERGIES  Review of patient's allergies indicates:   Allergen Reactions    Ibuprofen     Vicodin [hydrocodone-acetaminophen] Hives     Patient states that she can take percocet     ANES-RELATED HOME Rx 2017  Atorvastatin Insulin glargine    Insulin aspart    Anesthesia Evaluation         Review of Systems  Anesthesia Hx:  Denies Hx of Anesthetic complications History of prior surgery of interest to airway management or planning: (c/s )  Denies Personal Hx of Anesthesia complications.   Social:  Non-Smoker, No Alcohol Use    Hematology/Oncology:         -- Anemia:   EENT/Dental:   Bad teeth   Cardiovascular:   Hypertension    Pulmonary:  Pulmonary Normal    Renal/:  Renal/ Normal     Hepatic/GI:   GERD    Musculoskeletal:  Musculoskeletal Normal    Neurological:  Neurology Normal    Endocrine:   Diabetes      Wt Readings from Last 3 Encounters:   17 70.3 kg (155 lb)   17 70.3 kg (155 lb)   17 70.8 kg (156 lb)     Temp Readings from Last 3 Encounters:   17 36.4 °C (97.6 °F) (Oral)   17 36.3 °C (97.4 °F) (Oral)   17 36.8 °C (98.3 °F)     BP Readings from Last 3 Encounters:   17 127/80   17 (!) 145/85   17 135/89     Pulse Readings from Last 3 Encounters:   17 91   17 71   17 70       Physical Exam  General:  Well nourished    Airway/Jaw/Neck:  AIRWAY FINDINGS: Normal    Eyes/Ears/Nose:  EYES/EARS/NOSE FINDINGS: Normal   Dental:  Dental Findings:    Chest/Lungs:  Chest/Lungs Clear    Heart/Vascular:  Heart Findings: Normal Heart murmur: negative        Mental Status:  Mental Status Findings: Normal       Lab Results   Component Value Date    WBC 6.15 04/06/2017    HGB 10.5 (L) 04/06/2017    HCT 34.5 (L) 04/06/2017    MCV 68 (L) 04/06/2017     (H) 04/06/2017       Chemistry        Component Value Date/Time     04/06/2017 0719    K 3.5 04/06/2017 0719     04/06/2017 0719    CO2 27 04/06/2017 0719    BUN 5 (L) 04/06/2017 0719    CREATININE 0.58 04/06/2017 0719     (H) 04/06/2017 0719        Component Value Date/Time    CALCIUM 9.4 04/06/2017 0719    ALKPHOS 115 04/06/2017 0719    AST 24 04/06/2017 0719    ALT 22 04/06/2017 0719    BILITOT 0.5 04/06/2017 0719        Lab Results   Component Value Date    ALBUMIN 4.4 04/06/2017     No results found for: TSH, E4REOKC, B2KQLXM, THYROIDAB    UPT 2017-05-08  ?      Anesthesia Plan  Type of Anesthesia, risks & benefits discussed:  Anesthesia Type:  general  Patient's Preference:   Intra-op Monitoring Plan:   Intra-op Monitoring Plan Comments:   Post Op Pain Control Plan:   Post Op Pain Control Plan Comments:   Induction:    Beta Blocker:  Patient is not currently on a Beta-Blocker (No further documentation required).       Informed Consent: Patient understands risks and agrees with Anesthesia plan.  Questions answered. Anesthesia consent signed with patient.  ASA Score: 2     Day of Surgery Review of History & Physical:        Anesthesia Plan Notes: 2017-05-08   - still having periods      ~ AM UPT neg   - AM (glucose) 168        Ready For Surgery From Anesthesia Perspective.

## 2017-05-08 NOTE — OP NOTE
DATE OF PROCEDURE:  05/08/2017    PREOPERATIVE DIAGNOSES:  Painful tender mass lower abdominal wall, size 5 x 7 x   5 cm, intramuscular rectus sheath involvement.    POSTOPERATIVE DIAGNOSES:  Painful tender mass lower abdominal wall, size 5 x 7 x   5 cm, intramuscular rectus sheath involvement, and endometrioma.    PROCEDURE:  Excision of abdominal wall mass.    SURGEON:  Monica Ortega M.D.    ANESTHESIA:  General.    PROCEDURE IN DETAIL:  After satisfactory general endotracheal anesthesia, the   patient was positioned.  Abdomen was prepped and draped in normal sterile manner   using ChloraPrep.  Incision was made at the area in a transverse fashion, was   taken down to deep subcutaneous tissues.  Subcutaneous clamped and bovied,   further taken down.  The dissection was carried down to the deep subcutaneous   tissue.  Mass was then found to be involving the rectus sheath and rectus muscle   and part of the peritoneum.  Complete excision of the mass was performed with   0.5 cm margin beyond the mass.  The patient had a huge bleeders feeding to the   area, which were suture ligated using 2-0 silk suture ligatures.  Hemostasis was   satisfactorily maintained.  Wound was irrigated with normal saline solution.    The defect in the abdominal wall was then closed using interrupted #1 Prolene   suture.  Subcutaneous tissues were approximated with 3-0 Vicryl and skin was   closed using running 4-0 nylon suture.  Xeroform, 4 x 4s dressing was applied.    The instrument count, sponge count, and needle count were correct.  The patient   tolerated it well.  Estimated blood loss was 100 mL.  Specimen removed was   endometrioma of abdominal wall.  The patient was sent to Recovery Room in stable   condition.      JAMES  dd: 05/08/2017 11:39:09 (CDT)  td: 05/08/2017 15:08:13 (CDT)  Doc ID   #[id number]  Job ID #[id number]    CC:

## 2017-05-08 NOTE — PLAN OF CARE
Pt tolerating juice and states nausea resolved. VSS. Pt states did not take BP med this AM due to fasting for surgery, Instructed to resume it when she gets home. Criteria met for discharge. No surgical site complications, pt has voided. IV discontinued and pt is dressed ready for discharge. Discharge instructions given to pt and wife with time for questions and they voiced understanding. Printed copy of instructions also given. Paper rx's given.

## 2017-05-09 ENCOUNTER — PATIENT OUTREACH (OUTPATIENT)
Dept: ADMINISTRATIVE | Facility: CLINIC | Age: 27
End: 2017-05-09
Payer: MEDICARE

## 2017-05-09 NOTE — DISCHARGE SUMMARY
DATE OF ADMISSION:  05/08/2017    DATE OF DISCHARGE:  05/08/2017    HISTORY OF PRESENT ILLNESS:  This 26-year-old female admitted with painful,   tender, swelling of the left lower quadrant abdominal wall area.  The patient   underwent ultrasound, which showed possible mass.  The patient was diagnosed   with endometrioma of the abdominal wall, underwent excision of the endometrioma   of the abdominal wall involving the left rectus sheath, muscle and fascia.  The   patient did well postoperatively, was out of bed ambulating, going to the   bathroom.  No nausea or vomiting.  Tolerating diet.  The patient discharged home   on p.o. pain pill.  Advised not to do any heavy lifting and to keep the   dressing dry.  The patient to come and see me in the office in a week and to   call if she had any problems, was given prescription for pain pill and no heavy   lifting, no driving.      JAMES  dd: 05/08/2017 18:13:22 (CDT)  td: 05/09/2017 03:49:24 (CDT)  Doc ID   #[id number]  Job ID #[id number]    CC:

## 2017-05-09 NOTE — PATIENT INSTRUCTIONS
Wound Check After Surgery, No Complication  Surgery involves cutting through layers of skin, fatty tissue, muscle, and sometimes bone and cartilage. Sutures (stitches) or staples are used to close all layers of the wound. The sutures on the inside will dissolve in about 2 to 3 weeks. Any sutures or staples used on the outside need to be removed in about 7 to 14 days, depending on the location.  It is normal to have some clear or bloody discharge on the wound covering or bandage (dressing) for the first few days after surgery. If your wound was sutured (sewn) closed, you should not have to change the dressing more than twice a day in the first few days. Bleeding or discharge requiring more frequent dressing changes can be a sign of a problem.  It is normal to feel pain at the incision site. The pain decreases as the wound heals. Most of the pain and soreness from the skin incision should go away by the time the sutures or staples are removed. Soreness and pain from deeper tissues may last another week or two.  Pain that continues more than a few weeks after surgery or pain that worsens anytime after surgery can be a sign of a problem, such as:  · Infection  · Separation of wound edges  · Collection of blood or other below the skin  Home care  Different types of surgery require different types of care and dressing changes. It is important to follow all instructions and advice from your surgeon, as well as other members of your healthcare team.  Wound care  · Keep the wound clean, as directed by your healthcare provider.  · Change the dressing as directed. Change the dressing sooner if it becomes wet or stained with blood or fluid from the wound.  · Bathe with a sponge (no shower or tub baths) for the first few days after surgery, or until there is no more drainage from the wound. Unless you received different instructions from your surgeon, you can then shower. Do not soak the area in water (no baths or swimming)  until the tape, sutures, or staples are removed and any wound opening has dried out and healed.  Changing the dressing  · Wash your hands before changing the dressings.  · Carefully remove the dressing and tape; dont just yank it off. If it sticks to the wound, you may need to wet it a little to remove it, unless your healthcare provider told you not to wet it.  · Wash your hands again before putting on a new, clean dressing.  · Gently clean the wound with clean water (or saline) using gauze or a clean washcloth. Do not rub it or pick at it.  · Do not use soap, alcohol, hydrogen peroxide, or any other cleanser.  · If you were told to dry the wound before putting on a new dressing, gently pat it dry. Do not rub.  · Throw out the old dressing. Do not reuse it!  · Wash your hands again when you are done.  Types of dressings  Your healthcare team will tell you what type of dressing to put on your wound. Follow your healthcare teams instructions carefully, and contact them if you have any questions. Two common types of dressings are described below. You may have one of these or another type.  · Dry dressing. Use dry gauze. If the wound is still draining, use a nonadherent dressing, which shouldnt stick to the wound.  · Wet-to-dry dressing. Wet the gauze, and squeeze out the excess water (or saline), before putting it on. Then, cover this with a dry pad.  Medicines  · If you were given antibiotics, take them until they are used up or your healthcare provider tells you to stop. It is important to finish the antibiotics even though you feel better, to make sure the infection has cleared.  · You can take acetaminophen or ibuprofen for pain, unless you were given a different pain medicine to use. (Note: If you have chronic liver or kidney disease, or have ever had a stomach ulcer or gastrointestinal bleeding, or are taking blood thinner medicines, talk with your healthcare provider before using these  medicines.)  · Aspirin should never be used in anyone under 18 years of age who is ill with a fever. It may cause severe liver damage.  Follow-up care  Follow up with your healthcare provider, or as advised, for your next wound check or removal of your sutures, staples, or tape.  · If a culture was done, you will be notified if the results will affect your treatment. You can call as directed for the results.  · If imaging tests, such as X-rays, an ultrasound, or CT scan were done, they will be reviewed by a specialist. You will be notified of the results, especially if they affect treatment.  Call 911  Call emergency services right away if any of these occur:  · Trouble breathing or swallowing, wheezing  · Hoarse voice or trouble speaking  · Extreme confusion  · Extreme drowsiness or trouble awakening  · Fainting or loss of consciousness  · Rapid heart rate or very slow heart rate  · Vomiting blood, or large amounts of blood in stool  · Discomfort in the center of the chest that feels like pressure, squeezing, a sense of fullness, or pain.  · Discomfort or pain in other upper body areas, such as the back, one or both arms, neck, jaw, or stomach  · Stroke symptoms (spot a stroke FAST)  ¨ F: Face drooping. One side of the face is numb or droops.  ¨ A: Arm weakness. One arm feels weak or numb.  ¨ S: Speech difficulty: Speech is slurred, or the person is unable to speak.  ¨ T: Time to call 911. Even if symptoms go away, call 911.  When to seek medical advice  Call your healthcare provider right away if any of the following occur:  · Increasing pain at the site of surgery  · Fever over 100.4º F (38º C)  · Redness around the wound  · Fluid, pus, or blood draining from the wound  · Vomiting, constipation, or diarrhea  Date Last Reviewed: 9/27/2015  © 9055-9543 [employer]. [address] [place] [address], [place] [address]. All rights reserved. This information is not intended as a substitute for professional  medical care. Always follow your healthcare professional's instructions.

## 2017-05-09 NOTE — ANESTHESIA POSTPROCEDURE EVALUATION
"Anesthesia Post Evaluation    Patient: Marylee Hill    Procedure(s) Performed: Procedure(s) (LRB):  EXCISION-MASS ABDOMEN (N/A)    Final Anesthesia Type: general  Patient location during evaluation: PACU  Patient participation: Yes- Able to Participate  Level of consciousness: awake and alert  Post-procedure vital signs: reviewed and stable  Pain management: adequate  Airway patency: patent  PONV status at discharge: No PONV  Anesthetic complications: no      Cardiovascular status: hemodynamically stable  Respiratory status: unassisted  Hydration status: euvolemic  Follow-up not needed.  Comments: Based on chart notes; pt was discharged but is back in ED today for nausea        Visit Vitals    BP (!) 184/88    Pulse (!) 58    Temp 36.9 °C (98.4 °F) (Oral)    Resp 16    Ht 5' 3" (1.6 m)    Wt 70.3 kg (155 lb)    LMP 04/13/2017 (Approximate)    SpO2 98%    Breastfeeding No    BMI 27.46 kg/m2       Pain/Umair Score: Pain Assessment Performed: Yes (5/8/2017  1:40 PM)  Presence of Pain: complains of pain/discomfort (5/8/2017  1:40 PM)  Pain Rating Prior to Med Admin: 6 (5/8/2017  1:22 PM)  Pain Rating Post Med Admin: 8 (5/8/2017  1:40 PM)  Umair Score: 10 (5/8/2017  3:40 PM)      "

## 2017-05-09 NOTE — ANESTHESIA RELEASE NOTE
"Anesthesia Release from PACU Note    Patient: Marylee Hill    Procedure(s) Performed: Procedure(s) (LRB):  EXCISION-MASS ABDOMEN (N/A)    Anesthesia type: general    Post pain: Adequate analgesia    Post assessment: no apparent anesthetic complications    Last Vitals:   Visit Vitals    BP (!) 184/88    Pulse (!) 58    Temp 36.9 °C (98.4 °F) (Oral)    Resp 16    Ht 5' 3" (1.6 m)    Wt 70.3 kg (155 lb)    LMP 04/13/2017 (Approximate)    SpO2 98%    Breastfeeding No    BMI 27.46 kg/m2       Post vital signs: stable    Level of consciousness: awake, alert  and oriented    Nausea/Vomiting: nausea back in ER today for nausea    Complications: post-op nausea 1 day after surgery    Airway Patency: patent    Respiratory: unassisted, spontaneous ventilation, room air    Cardiovascular: stable    Hydration: euvolemic  "

## 2017-06-01 PROBLEM — N30.00 ACUTE CYSTITIS WITHOUT HEMATURIA: Status: ACTIVE | Noted: 2017-06-01

## 2018-02-14 ENCOUNTER — OFFICE VISIT (OUTPATIENT)
Dept: NEUROLOGY | Facility: HOSPITAL | Age: 28
End: 2018-02-14
Attending: INTERNAL MEDICINE
Payer: MEDICARE

## 2018-02-14 ENCOUNTER — ANESTHESIA EVENT (OUTPATIENT)
Dept: ENDOSCOPY | Facility: HOSPITAL | Age: 28
End: 2018-02-14

## 2018-02-14 VITALS
HEART RATE: 71 BPM | DIASTOLIC BLOOD PRESSURE: 71 MMHG | SYSTOLIC BLOOD PRESSURE: 112 MMHG | BODY MASS INDEX: 28.72 KG/M2 | HEIGHT: 62 IN | TEMPERATURE: 99 F | WEIGHT: 156.06 LBS

## 2018-02-14 DIAGNOSIS — R11.2 NAUSEA AND VOMITING, INTRACTABILITY OF VOMITING NOT SPECIFIED, UNSPECIFIED VOMITING TYPE: Primary | ICD-10-CM

## 2018-02-14 PROCEDURE — 99213 OFFICE O/P EST LOW 20 MIN: CPT | Performed by: INTERNAL MEDICINE

## 2018-02-14 NOTE — PROGRESS NOTES
"[facility]    CC: nausea and vomiting    HPI 27 y.o. female with several months of persistent, chronic, post-prandial nausea and vomiting not associated with weight loss or overt Gi bleeding.  She also endorses only having one bowel movement weekly.  She states that these symptoms have been present for over two years but worsened in May after removal of an abdominal wall endometrioma by Dr. Ortega. Despite multiple ER visits and trials of anti-emetics she has had no resolution of her symptoms. She denies any opioid pain medications but does take NSAIDS around every other day.  There is no associated abdominal pain, dysphagia, GI bleeding, fevers, or any other related complaints.    Past records reviewed.    PMH:   Hypertension   DM   History of chronic pain   History of endometrioma removed by Dr. Ortega     Social History: no D[place] abuse   Family History:  No family history of IBD or GI malignancy    Review of Systems  General ROS: negative for chills, fever; positive weight loss and fatigue   Ophthalmic ROS: negative for blurry vision, photophobia or eye pain  ENT ROS: negative for epistaxis, sore throat or rhinorrhea  Respiratory ROS: no cough, shortness of breath, or wheezing  Cardiovascular ROS: no chest pain or dyspnea on exertion  Gastrointestinal ROS: positive for nausea, vomiting, and bloating; negative for bleeding, dysphagia, pain  Genito-Urinary ROS: no dysuria, trouble voiding, or hematuria  Musculoskeletal ROS: negative for gait disturbance or muscular weakness  Neurological ROS: no syncope or seizures; no ataxia  Dermatological ROS: negative for pruritis, rash and jaundice    Physical Examination  /71   Pulse 71   Temp 98.7 °F (37.1 °C) (Oral)   Ht 5' 2" (1.575 m)   Wt 70.8 kg (156 lb 1.4 oz)   LMP 02/03/2018   BMI 28.55 kg/m²   General appearance: alert, cooperative, no distress  HENT: Normocephalic, atraumatic, neck symmetrical, no nasal discharge   Eyes: conjunctivae/corneas " clear, PERRL, EOM's intact  Lungs: clear to auscultation bilaterally, no dullness to percussion bilaterally  Heart: regular rate and rhythm without rub; no displacement of the PMI   Abdomen: soft, non-tender; bowel sounds normoactive; no organomegaly  Extremities: extremities symmetric; no clubbing, cyanosis, or edema  Integument: Skin color, texture, turgor normal; no rashes; hair distrubution normal  Neurologic: Alert and oriented X 3, normal strength, normal coordination and gait  Psychiatric: no pressured speech; normal affect; no evidence of impaired cognition     Labs:  Lab Results   Component Value Date    WBC 9.30 05/10/2017    HGB 9.5 (L) 05/10/2017    HCT 31.6 (L) 05/10/2017    MCV 67 (L) 05/10/2017     05/10/2017         Imaging: CT (reviewed)   1. Persistent abnormal 2.5 x 2.5 cm round enhancing masslike lesion in left rectus abdominis musculature which could represent postsurgical desmoid tumor versus possible endometrial implant if patient is post  section.    Previous medical records reviewed     Assessment:   27 year old female with chronic, progressive complaints of abdominal bloating and N/V after eating suspicious for a diagnosis of delayed gastric emptying due to diabetic gastroparesis including a previous history of poorly controlled DM.     Plan:   1. EGD tomorrow with biopsy for H pylori as indicated   2. If negative for mechanical obstruction, evaluation with smart pill (vs GES if smartpill not approved)   3. Small frequent meals  4. Limit NSAIDS and opioids as possible   5. Daily Miralax for constipation    Patient of Dr. Jordan Jameson MD   [address] [place] [address], [address]   [place] [address] [phone]

## 2018-02-14 NOTE — PATIENT INSTRUCTIONS
You are scheduled for an EGD on ____Thursday, February 15, 2018 _____________________________    You should eat light meals the day before the procedure and nothing to eat or drink after midnight the night before your procedure.    You will need to be at the 1st floor admission desk at the hospital on __1130am.

## 2018-02-15 ENCOUNTER — TELEPHONE (OUTPATIENT)
Dept: NEUROLOGY | Facility: HOSPITAL | Age: 28
End: 2018-02-15

## 2018-02-15 ENCOUNTER — ANESTHESIA (OUTPATIENT)
Dept: ENDOSCOPY | Facility: HOSPITAL | Age: 28
End: 2018-02-15

## 2018-02-15 DIAGNOSIS — R11.2 NAUSEA AND VOMITING, INTRACTABILITY OF VOMITING NOT SPECIFIED, UNSPECIFIED VOMITING TYPE: Primary | ICD-10-CM

## 2018-02-15 NOTE — ANESTHESIA PREPROCEDURE EVALUATION
2018  Marylee Hill is a 27 y.o., female w N/V for EGD.    PRIOR ANES (Epic) 2014   [mid 4, fent 150, prop 150 => VSS]   [iso] NAAC   [easy mask; ETT Castillo#2, Grade II]    ANES-RELATED MED/SURG  HTN  DM II  HLD  Still having periods    Past Surgical History:   Procedure Laterality Date     SECTION, CLASSIC       ALLERGIES  Review of patient's allergies indicates:   Allergen Reactions    Ibuprofen     Vicodin [hydrocodone-acetaminophen] Hives     Patient states that she can take percocet     ANES-RELATED HOME Rx 2017  Atorvastatin Insulin glargine    Insulin aspart    Pre-op Assessment         Review of Systems  Anesthesia Hx:  Denies Hx of Anesthetic complications  History of prior surgery of interest to airway management or planning: (c/s )  Denies Personal Hx of Anesthesia complications.   Social:  Non-Smoker, No Alcohol Use    Hematology/Oncology:         -- Anemia:   EENT/Dental:   Bad teeth   Cardiovascular:   Hypertension    Pulmonary:  Pulmonary Normal    Renal/:  Renal/ Normal     Hepatic/GI:   GERD    Musculoskeletal:  Musculoskeletal Normal    Neurological:  Neurology Normal    Endocrine:   Diabetes      Wt Readings from Last 1 Encounters:   18 70.8 kg (156 lb 1.4 oz)     Temp Readings from Last 1 Encounters:   18 37.1 °C (98.7 °F) (Oral)     BP Readings from Last 1 Encounters:   18 112/71     Pulse Readings from Last 1 Encounters:   18 71     SpO2 Readings from Last 1 Encounters:   18 100%       Physical Exam  General:  Well nourished    Airway/Jaw/Neck:  AIRWAY FINDINGS: Normal    Eyes/Ears/Nose:  EYES/EARS/NOSE FINDINGS: Normal   Dental:  Dental Findings:    Chest/Lungs:  Chest/Lungs Clear    Heart/Vascular:  Heart Findings: Normal Heart murmur: negative       Mental Status:  Mental Status Findings: Normal       Lab Results    Component Value Date    WBC 9.30 05/10/2017    HGB 9.5 (L) 05/10/2017    HCT 31.6 (L) 05/10/2017    MCV 67 (L) 05/10/2017     05/10/2017       Chemistry        Component Value Date/Time     05/10/2017 0434    K 3.6 05/10/2017 0434     05/10/2017 0434    CO2 20 (L) 05/10/2017 0434    BUN 5 (L) 05/10/2017 0434    CREATININE 0.53 05/10/2017 0434     (H) 05/10/2017 0434        Component Value Date/Time    CALCIUM 8.8 05/10/2017 0434    ALKPHOS 109 05/10/2017 0434    AST 21 05/10/2017 0434    ALT 22 05/10/2017 0434    BILITOT 0.8 05/10/2017 0434        Lab Results   Component Value Date    ALBUMIN 3.8 05/10/2017     No results found for: TSH, T2EWKCT, D4FJYRP, THYROIDAB    UPT 71571096  ?    EKG [id number]  Vertical Axis  Normal Variant    Anesthesia Plan  Type of Anesthesia, risks & benefits discussed:  Anesthesia Type:  general  Patient's Preference:   Intra-op Monitoring Plan:   Intra-op Monitoring Plan Comments:   Post Op Pain Control Plan:   Post Op Pain Control Plan Comments:   Induction:    Beta Blocker:  Patient is not currently on a Beta-Blocker (No further documentation required).       Informed Consent: Patient understands risks and agrees with Anesthesia plan.  Questions answered. Anesthesia consent signed with patient.  ASA Score: 2     Day of Surgery Review of History & Physical:        Anesthesia Plan Notes: 2017-05-08   - still having periods      ~ AM UPT neg   - AM (glucose) 168        Ready For Surgery From Anesthesia Perspective.

## 2018-02-15 NOTE — TELEPHONE ENCOUNTER
Pt's  did not arrive.  Pt is requesting to reschedule today's procedure.  EDG rescheduled to Thursday, March 8, 2018.  Pt repeated date of procedure.

## 2018-02-15 NOTE — TELEPHONE ENCOUNTER
----- Message from Malia Junior sent at 2/15/2018  9:05 AM CST -----  Contact: Patient  GI- Patient cant make her procedure today . Call back number to reschedule is [phone].

## 2018-03-08 ENCOUNTER — TELEPHONE (OUTPATIENT)
Dept: NEUROLOGY | Facility: HOSPITAL | Age: 28
End: 2018-03-08

## 2018-03-08 DIAGNOSIS — R11.2 NON-INTRACTABLE VOMITING WITH NAUSEA, UNSPECIFIED VOMITING TYPE: Primary | ICD-10-CM

## 2018-03-08 NOTE — TELEPHONE ENCOUNTER
EGD reschedule with pt on Thursday, March 15, 2018.  Pt given instructions verbally.  Pt repeated date and instructions correctly.

## 2018-03-13 PROBLEM — R11.2 NAUSEA AND VOMITING: Status: ACTIVE | Noted: 2018-03-13

## 2018-03-22 ENCOUNTER — ANESTHESIA EVENT (OUTPATIENT)
Dept: ENDOSCOPY | Facility: HOSPITAL | Age: 28
End: 2018-03-22

## 2018-03-22 ENCOUNTER — ANESTHESIA (OUTPATIENT)
Dept: ENDOSCOPY | Facility: HOSPITAL | Age: 28
End: 2018-03-22

## 2018-03-22 ENCOUNTER — TELEPHONE (OUTPATIENT)
Dept: NEUROLOGY | Facility: HOSPITAL | Age: 28
End: 2018-03-22

## 2018-03-22 NOTE — ANESTHESIA PREPROCEDURE EVALUATION
03/22/2018  Marylee Hill is a 27 y.o., female having EGD for intractable N / V.  Hx HTN    Pre-op Assessment         Review of Systems

## 2018-03-23 ENCOUNTER — TELEPHONE (OUTPATIENT)
Dept: NEUROLOGY | Facility: HOSPITAL | Age: 28
End: 2018-03-23

## 2018-03-23 DIAGNOSIS — R11.2 NON-INTRACTABLE VOMITING WITH NAUSEA, UNSPECIFIED VOMITING TYPE: Primary | ICD-10-CM

## 2018-03-23 NOTE — TELEPHONE ENCOUNTER
EGD reschedule with pt on Thursday, March 29, 2018.  Pt instructed to eat light meals on Wednesday, March 28, 2018 with nothing to eat or drink after midnight.  Pt notified Endoscopy staff will contact with arrival time to hospital 1st floor admit.  Pt repeated all information correctly.

## 2018-03-28 ENCOUNTER — TELEPHONE (OUTPATIENT)
Dept: NEUROLOGY | Facility: HOSPITAL | Age: 28
End: 2018-03-28

## 2018-03-28 NOTE — TELEPHONE ENCOUNTER
Pt notified of 930 arrival time to hospital 1st floor admit on Thursday, March 29, 2018.  Pt instructed to eat light meals on today, with nothing to eat or drink after midnight. Pt repeated all information correctly.

## 2018-03-29 ENCOUNTER — HOSPITAL ENCOUNTER (OUTPATIENT)
Facility: HOSPITAL | Age: 28
Discharge: HOME | End: 2018-03-29
Attending: INTERNAL MEDICINE | Admitting: INTERNAL MEDICINE
Payer: MEDICARE

## 2018-03-29 ENCOUNTER — ANESTHESIA EVENT (OUTPATIENT)
Dept: ENDOSCOPY | Facility: HOSPITAL | Age: 28
End: 2018-03-29
Payer: MEDICARE

## 2018-03-29 ENCOUNTER — ANESTHESIA (OUTPATIENT)
Dept: ENDOSCOPY | Facility: HOSPITAL | Age: 28
End: 2018-03-29
Payer: MEDICARE

## 2018-03-29 ENCOUNTER — SURGERY (OUTPATIENT)
Age: 28
End: 2018-03-29

## 2018-03-29 DIAGNOSIS — R11.2 NAUSEA AND VOMITING, INTRACTABILITY OF VOMITING NOT SPECIFIED, UNSPECIFIED VOMITING TYPE: Primary | ICD-10-CM

## 2018-03-29 DIAGNOSIS — R11.2 NAUSEA & VOMITING: ICD-10-CM

## 2018-03-29 LAB
B-HCG UR QL: NEGATIVE
CTP QC/QA: YES
GLUCOSE SERPL-MCNC: 120 MG/DL (ref 70–110)
POCT GLUCOSE: 120 MG/DL (ref 70–110)

## 2018-03-29 PROCEDURE — 82962 GLUCOSE BLOOD TEST: CPT | Performed by: INTERNAL MEDICINE

## 2018-03-29 PROCEDURE — 81025 URINE PREGNANCY TEST: CPT | Performed by: INTERNAL MEDICINE

## 2018-03-29 PROCEDURE — 25000003 PHARM REV CODE 250: Performed by: INTERNAL MEDICINE

## 2018-03-29 PROCEDURE — 37000009 HC ANESTHESIA EA ADD 15 MINS: Performed by: INTERNAL MEDICINE

## 2018-03-29 PROCEDURE — 63600175 PHARM REV CODE 636 W HCPCS: Performed by: NURSE ANESTHETIST, CERTIFIED REGISTERED

## 2018-03-29 PROCEDURE — 37000008 HC ANESTHESIA 1ST 15 MINUTES: Performed by: INTERNAL MEDICINE

## 2018-03-29 PROCEDURE — 43235 EGD DIAGNOSTIC BRUSH WASH: CPT | Performed by: INTERNAL MEDICINE

## 2018-03-29 RX ORDER — PROPOFOL 10 MG/ML
VIAL (ML) INTRAVENOUS
Status: DISCONTINUED | OUTPATIENT
Start: 2018-03-29 | End: 2018-03-29

## 2018-03-29 RX ORDER — SODIUM CHLORIDE 9 MG/ML
INJECTION, SOLUTION INTRAVENOUS CONTINUOUS
Status: DISCONTINUED | OUTPATIENT
Start: 2018-03-29 | End: 2018-03-29 | Stop reason: HOSPADM

## 2018-03-29 RX ORDER — PROPOFOL 10 MG/ML
VIAL (ML) INTRAVENOUS CONTINUOUS PRN
Status: DISCONTINUED | OUTPATIENT
Start: 2018-03-29 | End: 2018-03-29

## 2018-03-29 RX ORDER — LIDOCAINE HCL/PF 100 MG/5ML
SYRINGE (ML) INTRAVENOUS
Status: DISCONTINUED | OUTPATIENT
Start: 2018-03-29 | End: 2018-03-29

## 2018-03-29 RX ADMIN — PROPOFOL 150 MCG/KG/MIN: 10 INJECTION, EMULSION INTRAVENOUS at 11:03

## 2018-03-29 RX ADMIN — PROPOFOL 100 MG: 10 INJECTION, EMULSION INTRAVENOUS at 11:03

## 2018-03-29 RX ADMIN — LIDOCAINE HYDROCHLORIDE 100 MG: 20 INJECTION, SOLUTION INTRAVENOUS at 11:03

## 2018-03-29 RX ADMIN — SODIUM CHLORIDE: 0.9 INJECTION, SOLUTION INTRAVENOUS at 10:03

## 2018-03-29 NOTE — TRANSFER OF CARE
"Anesthesia Transfer of Care Note    Patient: Marylee Hill    Procedure(s) Performed: Procedure(s) (LRB):  ESOPHAGOGASTRODUODENOSCOPY (EGD) (N/A)    Patient location: GI    Anesthesia Type: MAC    Transport from OR: Transported from OR on room air with adequate spontaneous ventilation    Post pain: adequate analgesia    Post assessment: no apparent anesthetic complications and tolerated procedure well    Post vital signs: stable    Level of consciousness: awake, alert and oriented    Nausea/Vomiting: no nausea/vomiting    Complications: none          Last vitals:   Visit Vitals  /67 (Patient Position: Lying)   Pulse 62   Temp 36.7 °C (98.1 °F) (Oral)   Resp 18   Ht 5' 2" (1.575 m)   Wt 70.3 kg (155 lb)   LMP 03/17/2018   SpO2 96%   Breastfeeding? No   BMI 28.35 kg/m²     "

## 2018-03-29 NOTE — ANESTHESIA POSTPROCEDURE EVALUATION
"Anesthesia Post Evaluation    Patient: Marylee Hill    Procedure(s) Performed: Procedure(s) (LRB):  ESOPHAGOGASTRODUODENOSCOPY (EGD) (N/A)    Final Anesthesia Type: MAC  Patient location during evaluation: GI PACU  Patient participation: Yes- Able to Participate  Level of consciousness: awake and alert and oriented  Post-procedure vital signs: reviewed and stable  Pain management: adequate  Airway patency: patent  PONV status at discharge: No PONV  Anesthetic complications: no      Cardiovascular status: blood pressure returned to baseline and hemodynamically stable  Respiratory status: unassisted  Hydration status: euvolemic  Follow-up not needed.        Visit Vitals  /67 (Patient Position: Lying)   Pulse 62   Temp 36.7 °C (98.1 °F) (Oral)   Resp 18   Ht 5' 2" (1.575 m)   Wt 70.3 kg (155 lb)   LMP 03/17/2018   SpO2 96%   Breastfeeding? No   BMI 28.35 kg/m²       Pain/Umair Score: Presence of Pain: denies (3/29/2018 10:27 AM)      "

## 2018-03-29 NOTE — PROVATION PATIENT INSTRUCTIONS
Discharge Summary/Instructions after an Endoscopic Procedure  Patient Name: Marylee Hill  Patient MRN: [id number]  Patient YOB: 1990 Thursday, March 29, 2018  Arain Jameson MD  RESTRICTIONS:  During your procedure today, you received medications for sedation.  These   medications may affect your judgment, balance and coordination.  Therefore,   for 24 hours, you have the following restrictions:   - DO NOT drive a car, operate machinery, make legal/financial decisions,   sign important papers or drink alcohol.    ACTIVITY:  The following day: return to full activity including work, except no heavy   lifting, straining or running for 3 days if polyps were removed.  DIET:  Eat and drink normally unless instructed otherwise.     TREATMENT FOR COMMON SIDE EFFECTS:  - Mild abdominal pain, nausea, belching, bloating or excessive gas:  rest,   eat lightly and use a heating pad.  - Sore Throat: treat with throat lozenges and/or gargle with warm salt   water.  - Because air was used during the procedure, expelling large amounts of air   from your rectum or belching is normal.  - If a bowel prep was taken, you may not have a bowel movement for 1-3 days.    This is normal.  SYMPTOMS TO WATCH FOR AND REPORT TO YOUR PHYSICIAN:  1. Abdominal pain or bloating, other than gas cramps.  2. Chest pain.  3. Back pain.  4. Signs of infection such as: chills or fever occurring within 24 hours   after the procedure.  5. Rectal bleeding, which would show as bright red, maroon, or black stools.   (A tablespoon of blood from the rectum is not serious, especially if   hemorrhoids are present.)  6. Vomiting.  7. Weakness or dizziness.  GO DIRECTLY TO THE NEAREST EMERGENCY ROOM IF YOU HAVE ANY OF THE FOLLOWING:      Difficulty breathing              Chills and/or fever over 101 F   Persistent vomiting and/or vomiting blood   Severe abdominal pain   Severe chest pain   Black, tarry stools   Bleeding- more than one tablespoon   Any  other symptom or condition that you feel may need urgent attention  Your doctor recommends these additional instructions:  If any biopsies were taken, your doctors clinic will contact you in 1 to 2   weeks with any results.  - Discharge patient to home.   - I will schedule her for a Smartpill evaluation to measure gastric emptying   time for a formal diagnosis of gastroparesis.  If the Smartpill is not   covered, then a Gastric emptying study will be completed instead.  - Condition stable   The signs and symptoms of potential delayed complications were discussed   with the patient. If signs or symptoms of these complications develop, call   the [facility] at [phone].   Return to normal activities tomorrow.  Written discharge instructions were   provided to the patient.  For questions, problems or results please call your physician - Arian Jameson MD at Work:  [phone].  EMERGENCY PHONE NUMBER: [phone],  LAB RESULTS: [phone]  IF A COMPLICATION OR EMERGENCY SITUATION ARISES AND YOU ARE UNABLE TO REACH   YOUR PHYSICIAN - GO DIRECTLY TO THE EMERGENCY ROOM.  MD Arian Fernando MD  3/29/2018 11:59:50 AM  This report has been verified and signed electronically.

## 2018-03-29 NOTE — H&P
[facility]    CC: nausea and vomiting    HPI 27 y.o. female with several months of persistent, chronic, post-prandial nausea and vomiting not associated with weight loss or overt Gi bleeding.  She also endorses only having one bowel movement weekly.  She states that these symptoms have been present for over two years but worsened in May after removal of an abdominal wall endometrioma by Dr. Ortega. Despite multiple ER visits and trials of anti-emetics she has had no resolution of her symptoms. She denies any opioid pain medications but does take NSAIDS around every other day.  There is no associated abdominal pain, dysphagia, GI bleeding, fevers, or any other related complaints.     Past records reviewed.     PMH:   Hypertension   DM   History of chronic pain   History of endometrioma removed by Dr. Ortega       Past Medical History:   Diagnosis Date    Diabetes mellitus     Hyperlipidemia     Hypertension          Review of Systems  General ROS: negative for chills, fever or weight loss  Cardiovascular ROS: no chest pain or dyspnea on exertion  Gastrointestinal ROS: no abdominal pain, change in bowel habits, or black/ bloody stools, +Nausea and vomiting    Physical Examination  There were no vitals taken for this visit.  General appearance: alert, cooperative, no distress  HENT: Normocephalic, atraumatic, neck symmetrical, no nasal discharge   Lungs: clear to auscultation bilaterally, no dullness to percussion bilaterally  Heart: regular rate and rhythm without rub; no displacement of the PMI   Abdomen: soft, non-tender; bowel sounds normoactive; no organomegaly  Extremities: extremities symmetric; no clubbing, cyanosis, or edema  Neurologic: Alert and oriented X 3, normal strength, normal coordination and gait    Labs:    H/H 9.5/31.6  MCV 67  Platelets 344    Imaging: CT (reviewed)   1. Persistent abnormal 2.5 x 2.5 cm round enhancing masslike lesion in left rectus abdominis musculature which could  represent postsurgical desmoid tumor versus possible endometrial implant if patient is post  section.     Previous medical records reviewed      Assessment:   27 year old female with chronic, progressive complaints of abdominal bloating and N/V after eating suspicious for a diagnosis of delayed gastric emptying due to diabetic gastroparesis including a previous history of poorly controlled DM.      Plan:   1. EGD today with biopsy for H pylori as indicated   2. If negative for mechanical obstruction, evaluation with smart pill (vs GES if smartpill not approved)   3. Small frequent meals  4. Limit NSAIDS and opioids as possible   5. Daily Miralax for constipation    Arian Jameson MD   [address] [facility], [address]   [place] [address] [phone]

## 2018-03-29 NOTE — DISCHARGE INSTRUCTIONS
Post EGD Discharge Instruction    Marylee Hill  3/29/2018  Arian Jameson MD    RESTRICTIONS ON ACTIVITY:    -DO NOT drive a car, operate machinery or make critical decisions, or do activities that require coordination or balance for 24 hours.  -Following Day: Return to full activities including work.  -Diet: Eat and drink normally unless instructed otherwise.    TREATMENT FOR COMMON SIDE EFFECTS:  *Sore Throat - treat with throat lozenges, gargle with warm salt water.  *Mild abdominal pain & bloating- rest and take liquids only.    SYMPTOMS TO WATCH FOR AND REPORT TO YOUR PHYSICIAN:  1. Chills or fever occurring 24 hours after procedure.  2. Pain in chest.  3. SEVERE abdominal pain or bloating.  4. Rectal bleeding which could be maroon or black.    If you have any questions or problems, please call your Physician:    Arian Jameson MD     If a complication or emergency situation arises and you are unable to reach your Physician - GO TO THE EMERGENCY ROOM.

## 2018-03-29 NOTE — ANESTHESIA PREPROCEDURE EVALUATION
2018  Marylee Hill is a 27 y.o., female s/p expl lap for endometriosis; now abd pain & N/; for EGD    PRIOR ANES (in Epic)   [id number] Remov-Abd_Mass GA   mid, fent, iso, phenyleph 100 VSS NAAC    ANES-RELATED MED/SURG  Patient Active Problem List   Diagnosis    Type 2 diabetes mellitus without complication, with long-term current use of insulin    Essential hypertension    Hypercholesteremia    Left lower quadrant abdominal wall mass    Endometrioma    Mass of anterior abdominal wall    Acute cystitis without hematuria    Nausea and vomiting    Nausea & vomiting     Past Medical History:   Diagnosis Date    Diabetes mellitus     Hyperlipidemia     Hypertension      Past Surgical History:   Procedure Laterality Date     SECTION, CLASSIC         ALLERGIES  Review of patient's allergies indicates:   Allergen Reactions    Ibuprofen     Vicodin [hydrocodone-acetaminophen] Hives     Patient states that she can take percocet     ANES-RELATED HOME Rx [id number]  Lisinopril insulin    Anesthesia Evaluation         Review of Systems  Anesthesia Hx:  History of prior surgery of interest to airway management or planning: Personal Hx of Anesthesia complications   Social:  Non-Smoker   Hematology/Oncology:  Hematology Normal   Oncology Normal     EENT/Dental:EENT/Dental Normal   Cardiovascular:   Exercise tolerance: good Hypertension hyperlipidemia    Pulmonary:  Pulmonary Normal    Renal/:  Renal/ Normal     Hepatic/GI:  Hepatic/GI Normal N/V & abd pain after endometriosis   Musculoskeletal:  Musculoskeletal Normal    Neurological:  Neurology Normal    Endocrine:   Diabetes (insulin 70/30)      Lab Results   Component Value Date    WBC 9.30 05/10/2017    HGB 9.5 (L) 05/10/2017    HCT 31.6 (L) 05/10/2017    MCV 67 (L) 05/10/2017     05/10/2017       Chemistry        Component Value  Date/Time     05/10/2017 0434    K 3.6 05/10/2017 0434     05/10/2017 0434    CO2 20 (L) 05/10/2017 0434    BUN 5 (L) 05/10/2017 0434    CREATININE 0.53 05/10/2017 0434     (H) 05/10/2017 0434        Component Value Date/Time    CALCIUM 8.8 05/10/2017 0434    ALKPHOS 109 05/10/2017 0434    AST 21 05/10/2017 0434    ALT 22 05/10/2017 0434    BILITOT 0.8 05/10/2017 0434    ESTGFRAFRICA >60.0 05/10/2017 0434    EGFRNONAA >60.0 05/10/2017 0434            Lab Results   Component Value Date    ALBUMIN 3.8 05/10/2017    No results found for: TSH, K1JDXRB, M6QEHFP, THYROIDAB    CXR      EKG      ECHO      Physical Exam  General:  Well nourished    Airway/Jaw/Neck:  AIRWAY FINDINGS: Normal      Eyes/Ears/Nose:  EYES/EARS/NOSE FINDINGS: Normal   Dental:  DENTAL FINDINGS: Normal   Chest/Lungs:  Chest/Lungs Clear    Heart/Vascular:  Heart Findings: Normal Heart murmur: negative       Mental Status:  Mental Status Findings: Normal      Wt Readings from Last 1 Encounters:   03/29/18 70.3 kg (155 lb)     Temp Readings from Last 1 Encounters:   03/29/18 36.7 °C (98.1 °F) (Oral)     BP Readings from Last 1 Encounters:   03/29/18 133/67     Pulse Readings from Last 1 Encounters:   03/29/18 62     SpO2 Readings from Last 1 Encounters:   03/29/18 96%       Anesthesia Plan  Type of Anesthesia, risks & benefits discussed:  Anesthesia Type:  MAC  Patient's Preference:   Intra-op Monitoring Plan:   Intra-op Monitoring Plan Comments:   Post Op Pain Control Plan:   Post Op Pain Control Plan Comments:   Induction:    Beta Blocker:  Patient is not currently on a Beta-Blocker (No further documentation required).       Informed Consent: Patient understands risks and agrees with Anesthesia plan.  Questions answered. Anesthesia consent signed with patient.  ASA Score: 2     Day of Surgery Review of History & Physical:        Anesthesia Plan Notes: [id number]   - glucose today 120   - NPO p MN        Ready For Surgery From  Anesthesia Perspective.     Lab Results   Component Value Date    WBC 9.30 05/10/2017    HGB 9.5 (L) 05/10/2017    HCT 31.6 (L) 05/10/2017    MCV 67 (L) 05/10/2017     05/10/2017       Chemistry        Component Value Date/Time     05/10/2017 0434    K 3.6 05/10/2017 0434     05/10/2017 0434    CO2 20 (L) 05/10/2017 0434    BUN 5 (L) 05/10/2017 0434    CREATININE 0.53 05/10/2017 0434     (H) 05/10/2017 0434        Component Value Date/Time    CALCIUM 8.8 05/10/2017 0434    ALKPHOS 109 05/10/2017 0434    AST 21 05/10/2017 0434    ALT 22 05/10/2017 0434    BILITOT 0.8 05/10/2017 0434    ESTGFRAFRICA >60.0 05/10/2017 0434    EGFRNONAA >60.0 05/10/2017 0434            Lab Results   Component Value Date    ALBUMIN 3.8 05/10/2017    No results found for: TSH, K5TQYAF, P1UWZCC, THYROIDAB    CXR      EKG      ECHO

## 2018-03-31 ENCOUNTER — HOSPITAL ENCOUNTER (EMERGENCY)
Facility: HOSPITAL | Age: 28
Discharge: HOME | End: 2018-03-31
Attending: EMERGENCY MEDICINE
Payer: MEDICARE

## 2018-03-31 VITALS
DIASTOLIC BLOOD PRESSURE: 101 MMHG | BODY MASS INDEX: 28.52 KG/M2 | TEMPERATURE: 98 F | HEIGHT: 62 IN | WEIGHT: 155 LBS | OXYGEN SATURATION: 100 % | RESPIRATION RATE: 18 BRPM | HEART RATE: 76 BPM | SYSTOLIC BLOOD PRESSURE: 181 MMHG

## 2018-03-31 DIAGNOSIS — M79.601 RIGHT ARM PAIN: ICD-10-CM
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
## 2018-03-31 DIAGNOSIS — M54.50 LOWER BACK PAIN: ICD-10-CM

## 2018-03-31 DIAGNOSIS — S39.012A STRAIN OF LUMBAR REGION, INITIAL ENCOUNTER: Primary | ICD-10-CM

## 2018-03-31 LAB
B-HCG UR QL: NEGATIVE
CTP QC/QA: YES

## 2018-03-31 PROCEDURE — 25000003 PHARM REV CODE 250: Performed by: EMERGENCY MEDICINE

## 2018-03-31 PROCEDURE — 81025 URINE PREGNANCY TEST: CPT | Performed by: EMERGENCY MEDICINE

## 2018-03-31 PROCEDURE — 99284 EMERGENCY DEPT VISIT MOD MDM: CPT | Mod: 25

## 2018-03-31 RX ORDER — METHOCARBAMOL 500 MG/1
1000 TABLET, FILM COATED ORAL
Status: COMPLETED | OUTPATIENT
Start: 2018-03-31 | End: 2018-03-31

## 2018-03-31 RX ORDER — HYDROCODONE BITARTRATE AND ACETAMINOPHEN 10; 325 MG/1; MG/1
1 TABLET ORAL
Status: DISCONTINUED | OUTPATIENT
Start: 2018-03-31 | End: 2018-03-31 | Stop reason: HOSPADM

## 2018-03-31 RX ORDER — CYCLOBENZAPRINE HCL 10 MG
10 TABLET ORAL 3 TIMES DAILY PRN
Qty: 15 TABLET | Refills: 0 | Status: SHIPPED | OUTPATIENT
Start: 2018-03-31 | End: 2018-04-05

## 2018-03-31 RX ORDER — ONDANSETRON 4 MG/1
4 TABLET, ORALLY DISINTEGRATING ORAL
Status: COMPLETED | OUTPATIENT
Start: 2018-03-31 | End: 2018-03-31

## 2018-03-31 RX ORDER — HYDROCODONE BITARTRATE AND ACETAMINOPHEN 5; 325 MG/1; MG/1
1 TABLET ORAL EVERY 8 HOURS PRN
Qty: 8 TABLET | Refills: 0 | Status: SHIPPED | OUTPATIENT
Start: 2018-03-31 | End: 2018-03-31 | Stop reason: ALTCHOICE

## 2018-03-31 RX ORDER — OXYCODONE AND ACETAMINOPHEN 5; 325 MG/1; MG/1
1 TABLET ORAL EVERY 8 HOURS PRN
Qty: 8 TABLET | Refills: 0 | Status: SHIPPED | OUTPATIENT
Start: 2018-03-31 | End: 2018-08-01 | Stop reason: SDUPTHER

## 2018-03-31 RX ADMIN — METHOCARBAMOL 1000 MG: 500 TABLET ORAL at 09:03

## 2018-03-31 RX ADMIN — ONDANSETRON 4 MG: 4 TABLET, ORALLY DISINTEGRATING ORAL at 08:03

## 2018-03-31 NOTE — ED NOTES
Sling placed on right arm. Pt instructed to apply ice throughout the day for the next couple of days. Verbalizes understanding. Paper scrub top given to pt to wear home.
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
## 2018-03-31 NOTE — ED NOTES
Pt is now able to stand and is able to move her right arm. Makeshift sling removed from right arm. C-collar removed by patient.

## 2018-03-31 NOTE — ED NOTES
Pt did not take her bp medications today, but has them with her. States she will take them right now. Ok, per Dr. Garcia.
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
## 2018-04-02 VITALS
HEIGHT: 62 IN | TEMPERATURE: 98 F | BODY MASS INDEX: 28.52 KG/M2 | DIASTOLIC BLOOD PRESSURE: 76 MMHG | WEIGHT: 155 LBS | OXYGEN SATURATION: 99 % | HEART RATE: 57 BPM | SYSTOLIC BLOOD PRESSURE: 145 MMHG | RESPIRATION RATE: 14 BRPM

## 2018-04-03 ENCOUNTER — TELEPHONE (OUTPATIENT)
Dept: NEUROLOGY | Facility: HOSPITAL | Age: 28
End: 2018-04-03

## 2018-04-03 DIAGNOSIS — K31.84 GASTROPARESIS: Primary | ICD-10-CM

## 2018-04-03 NOTE — TELEPHONE ENCOUNTER
----- Message from Adalgisa Boyd sent at 4/3/2018  3:27 PM CDT -----   Thank you. In the future please  Send this to our department pool which is pre service intake.   ----- Message -----  From: Miguelina Stoner LPN  Sent: 4/3/2018   2:51 PM  To: Adalgisa Boyd    Smart pill for diagnosis of gastroparesis.  CPT 82681, DX-K31.84.      Please notify me, when/if approved.  Thanks.

## 2018-04-03 NOTE — TELEPHONE ENCOUNTER
----- Message from Arian Jameson MD sent at 4/1/2018  1:51 PM CDT -----  Please set this patient up to have a smart pill study for a diagnosis of gastroparesis. If not approved, she should have a gastric emptying study instead

## 2021-02-01 PROBLEM — R11.10 VOMITING: Status: ACTIVE | Noted: 2021-02-01

## 2021-05-04 ENCOUNTER — PATIENT MESSAGE (OUTPATIENT)
Dept: RESEARCH | Facility: HOSPITAL | Age: 31
End: 2021-05-04

## 2022-05-04 PROCEDURE — 81025 URINE PREGNANCY TEST: CPT | Performed by: NURSE PRACTITIONER

## 2022-05-04 PROCEDURE — 99285 EMERGENCY DEPT VISIT HI MDM: CPT | Mod: 25

## 2022-05-04 PROCEDURE — 82962 GLUCOSE BLOOD TEST: CPT

## 2022-05-04 PROCEDURE — 96361 HYDRATE IV INFUSION ADD-ON: CPT

## 2022-05-04 PROCEDURE — 96374 THER/PROPH/DIAG INJ IV PUSH: CPT

## 2022-05-04 PROCEDURE — 93010 ELECTROCARDIOGRAM REPORT: CPT | Mod: ,,, | Performed by: INTERNAL MEDICINE

## 2022-05-04 PROCEDURE — 93010 EKG 12-LEAD: ICD-10-PCS | Mod: ,,, | Performed by: INTERNAL MEDICINE

## 2022-05-04 PROCEDURE — 93005 ELECTROCARDIOGRAM TRACING: CPT

## 2022-05-05 ENCOUNTER — HOSPITAL ENCOUNTER (EMERGENCY)
Facility: HOSPITAL | Age: 32
Discharge: HOME | End: 2022-05-05
Attending: EMERGENCY MEDICINE
Payer: MEDICAID

## 2022-05-05 VITALS
HEART RATE: 68 BPM | HEIGHT: 62 IN | OXYGEN SATURATION: 100 % | TEMPERATURE: 98 F | DIASTOLIC BLOOD PRESSURE: 71 MMHG | WEIGHT: 147 LBS | RESPIRATION RATE: 18 BRPM | SYSTOLIC BLOOD PRESSURE: 139 MMHG | BODY MASS INDEX: 27.05 KG/M2

## 2022-05-05 DIAGNOSIS — S06.0X9A CONCUSSION WITH LOSS OF CONSCIOUSNESS, INITIAL ENCOUNTER: Primary | ICD-10-CM

## 2022-05-05 DIAGNOSIS — S09.90XA HEAD INJURY: ICD-10-CM

## 2022-05-05 DIAGNOSIS — S20.229A CONTUSION OF MID BACK, UNSPECIFIED LATERALITY, INITIAL ENCOUNTER: ICD-10-CM

## 2022-05-05 DIAGNOSIS — I10 HTN (HYPERTENSION): ICD-10-CM

## 2022-05-05 DIAGNOSIS — R07.89 CHEST WALL PAIN: ICD-10-CM

## 2022-05-05 LAB
ALBUMIN SERPL BCP-MCNC: 3.7 G/DL (ref 3.5–5.2)
ALP SERPL-CCNC: 110 U/L (ref 55–135)
ALT SERPL W/O P-5'-P-CCNC: 23 U/L (ref 10–44)
ANION GAP SERPL CALC-SCNC: 12 MMOL/L (ref 8–16)
AST SERPL-CCNC: 25 U/L (ref 10–40)
B-HCG UR QL: NEGATIVE
BASOPHILS # BLD AUTO: 0.04 K/UL (ref 0–0.2)
BASOPHILS NFR BLD: 0.7 % (ref 0–1.9)
BILIRUB SERPL-MCNC: 0.9 MG/DL (ref 0.1–1)
BUN SERPL-MCNC: 6 MG/DL (ref 6–20)
CALCIUM SERPL-MCNC: 9.7 MG/DL (ref 8.7–10.5)
CHLORIDE SERPL-SCNC: 103 MMOL/L (ref 95–110)
CO2 SERPL-SCNC: 24 MMOL/L (ref 23–29)
CREAT SERPL-MCNC: 0.8 MG/DL (ref 0.5–1.4)
CTP QC/QA: YES
DIFFERENTIAL METHOD: ABNORMAL
EOSINOPHIL # BLD AUTO: 0.1 K/UL (ref 0–0.5)
EOSINOPHIL NFR BLD: 1 % (ref 0–8)
ERYTHROCYTE [DISTWIDTH] IN BLOOD BY AUTOMATED COUNT: 15.9 % (ref 11.5–14.5)
EST. GFR  (AFRICAN AMERICAN): >60 ML/MIN/1.73 M^2
EST. GFR  (NON AFRICAN AMERICAN): >60 ML/MIN/1.73 M^2
GLUCOSE SERPL-MCNC: 270 MG/DL (ref 70–110)
HCT VFR BLD AUTO: 42.1 % (ref 37–48.5)
HGB BLD-MCNC: 13.5 G/DL (ref 12–16)
IMM GRANULOCYTES # BLD AUTO: 0.01 K/UL (ref 0–0.04)
IMM GRANULOCYTES NFR BLD AUTO: 0.2 % (ref 0–0.5)
LYMPHOCYTES # BLD AUTO: 2.7 K/UL (ref 1–4.8)
LYMPHOCYTES NFR BLD: 43.5 % (ref 18–48)
MCH RBC QN AUTO: 25.9 PG (ref 27–31)
MCHC RBC AUTO-ENTMCNC: 32.1 G/DL (ref 32–36)
MCV RBC AUTO: 81 FL (ref 82–98)
MONOCYTES # BLD AUTO: 0.4 K/UL (ref 0.3–1)
MONOCYTES NFR BLD: 7.1 % (ref 4–15)
NEUTROPHILS # BLD AUTO: 2.9 K/UL (ref 1.8–7.7)
NEUTROPHILS NFR BLD: 47.5 % (ref 38–73)
NRBC BLD-RTO: 0 /100 WBC
PLATELET # BLD AUTO: 230 K/UL (ref 150–450)
PMV BLD AUTO: 10.7 FL (ref 9.2–12.9)
POTASSIUM SERPL-SCNC: 4.2 MMOL/L (ref 3.5–5.1)
PROT SERPL-MCNC: 7.4 G/DL (ref 6–8.4)
RBC # BLD AUTO: 5.21 M/UL (ref 4–5.4)
SODIUM SERPL-SCNC: 139 MMOL/L (ref 136–145)
WBC # BLD AUTO: 6.09 K/UL (ref 3.9–12.7)

## 2022-05-05 PROCEDURE — 63600175 PHARM REV CODE 636 W HCPCS: Performed by: EMERGENCY MEDICINE

## 2022-05-05 PROCEDURE — 80053 COMPREHEN METABOLIC PANEL: CPT | Performed by: EMERGENCY MEDICINE

## 2022-05-05 PROCEDURE — 85025 COMPLETE CBC W/AUTO DIFF WBC: CPT | Performed by: EMERGENCY MEDICINE

## 2022-05-05 RX ORDER — KETOROLAC TROMETHAMINE 30 MG/ML
15 INJECTION, SOLUTION INTRAMUSCULAR; INTRAVENOUS
Status: COMPLETED | OUTPATIENT
Start: 2022-05-05 | End: 2022-05-05

## 2022-05-05 RX ORDER — KETOROLAC TROMETHAMINE 10 MG/1
10 TABLET, FILM COATED ORAL EVERY 6 HOURS PRN
Qty: 15 TABLET | Refills: 0 | Status: SHIPPED | OUTPATIENT
Start: 2022-05-05 | End: 2023-01-23

## 2022-05-05 RX ADMIN — SODIUM CHLORIDE, SODIUM LACTATE, POTASSIUM CHLORIDE, AND CALCIUM CHLORIDE 1000 ML: .6; .31; .03; .02 INJECTION, SOLUTION INTRAVENOUS at 03:05

## 2022-05-05 RX ADMIN — KETOROLAC TROMETHAMINE 15 MG: 30 INJECTION, SOLUTION INTRAMUSCULAR at 03:05
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
## 2022-05-05 NOTE — PROVIDER PROGRESS NOTES - EMERGENCY DEPT.
Encounter Date: 5/4/2022    ED Physician Progress Notes           05/05/2022  6:23 AM    Results for orders placed or performed during the hospital encounter of 05/05/22   CBC Auto Differential   Result Value Ref Range    WBC 6.09 3.90 - 12.70 K/uL    RBC 5.21 4.00 - 5.40 M/uL    Hemoglobin 13.5 12.0 - 16.0 g/dL    Hematocrit 42.1 37.0 - 48.5 %    MCV 81 (L) 82 - 98 fL    MCH 25.9 (L) 27.0 - 31.0 pg    MCHC 32.1 32.0 - 36.0 g/dL    RDW 15.9 (H) 11.5 - 14.5 %    Platelets 230 150 - 450 K/uL    MPV 10.7 9.2 - 12.9 fL    Immature Granulocytes 0.2 0.0 - 0.5 %    Gran # (ANC) 2.9 1.8 - 7.7 K/uL    Immature Grans (Abs) 0.01 0.00 - 0.04 K/uL    Lymph # 2.7 1.0 - 4.8 K/uL    Mono # 0.4 0.3 - 1.0 K/uL    Eos # 0.1 0.0 - 0.5 K/uL    Baso # 0.04 0.00 - 0.20 K/uL    nRBC 0 0 /100 WBC    Gran % 47.5 38.0 - 73.0 %    Lymph % 43.5 18.0 - 48.0 %    Mono % 7.1 4.0 - 15.0 %    Eosinophil % 1.0 0.0 - 8.0 %    Basophil % 0.7 0.0 - 1.9 %    Differential Method Automated    Comprehensive Metabolic Panel   Result Value Ref Range    Sodium 139 136 - 145 mmol/L    Potassium 4.2 3.5 - 5.1 mmol/L    Chloride 103 95 - 110 mmol/L    CO2 24 23 - 29 mmol/L    Glucose 270 (H) 70 - 110 mg/dL    BUN 6 6 - 20 mg/dL    Creatinine 0.8 0.5 - 1.4 mg/dL    Calcium 9.7 8.7 - 10.5 mg/dL    Total Protein 7.4 6.0 - 8.4 g/dL    Albumin 3.7 3.5 - 5.2 g/dL    Total Bilirubin 0.9 0.1 - 1.0 mg/dL    Alkaline Phosphatase 110 55 - 135 U/L    AST 25 10 - 40 U/L    ALT 23 10 - 44 U/L    Anion Gap 12 8 - 16 mmol/L    eGFR if African American >60 >60 mL/min/1.73 m^2    eGFR if non African American >60 >60 mL/min/1.73 m^2   POCT urine pregnancy   Result Value Ref Range    POC Preg Test, Ur Negative Negative     Acceptable Yes        Case signed out to me by  awaiting various imaging studies which were performed including CT head and C-spine which was negative for acute intracranial process, fracture or subluxation.  X-rays of shoulders negative for  fracture or dislocation.  X-rays of thoracic spine showed possible bony abnormality and recommended CT scan which was done revealing no acute abnormality in thoracic spine.  On re-evaluation, patient feeling improved after receiving Toradol in ED.  She appears stable for discomfort with discharge home.  Advised to follow-up with PCP for outpatient recheck, contact information for concussion clinic was also provided for follow-up.  Signs and symptoms that would warrant immediate return to ED were reviewed prior to discharge.      Diagnosis:  Final diagnoses:  [I10] HTN (hypertension)  [S09.90XA] Head injury  [S06.0X9A] Concussion with loss of consciousness, initial encounter (Primary)  [S20.229A] Contusion of mid back, unspecified laterality, initial encounter  [R07.89] Chest wall pain      Follow-up:  Cesia Emery, CHASITY  [address]  [place] [address]  [phone]    Schedule an appointment as soon as possible for a visit   Follow-up with your primary care physician for outpatient recheck.    [facility] [address]  [facility] [address]  [phone]    Schedule an appointment as soon as possible for a visit   This is a head injury/concussion specialty clinic you can also contact for follow-up regarding your head injury.    [place] - Emergency Dept  [address] [place] [address] [phone]    Return to the ED sooner for any new or worsening symptoms or for any other concerns.

## 2022-05-05 NOTE — ED NOTES
Pt resting. No new complaints. ED orders in progress. Pt SR up x 2. Bed in lowest position with wheels locked. Call bell within reach of pt.
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
## 2022-05-09 LAB
POCT GLUCOSE: 290 MG/DL (ref 70–110)
POCT GLUCOSE: 333 MG/DL (ref 70–110)

## 2023-01-23 ENCOUNTER — OFFICE VISIT (OUTPATIENT)
Dept: CARDIOLOGY | Facility: CLINIC | Age: 33
End: 2023-01-23
Payer: MEDICAID

## 2023-01-23 VITALS
SYSTOLIC BLOOD PRESSURE: 115 MMHG | OXYGEN SATURATION: 98 % | DIASTOLIC BLOOD PRESSURE: 62 MMHG | WEIGHT: 159.81 LBS | HEART RATE: 64 BPM | BODY MASS INDEX: 29.41 KG/M2 | HEIGHT: 62 IN

## 2023-01-23 DIAGNOSIS — R06.02 SOBOE (SHORTNESS OF BREATH ON EXERTION): ICD-10-CM

## 2023-01-23 DIAGNOSIS — R07.89 OTHER CHEST PAIN: ICD-10-CM

## 2023-01-23 DIAGNOSIS — E11.9 TYPE 2 DIABETES MELLITUS WITHOUT COMPLICATION, WITH LONG-TERM CURRENT USE OF INSULIN: ICD-10-CM

## 2023-01-23 DIAGNOSIS — I10 ESSENTIAL HYPERTENSION: ICD-10-CM

## 2023-01-23 DIAGNOSIS — Z79.4 TYPE 2 DIABETES MELLITUS WITHOUT COMPLICATION, WITH LONG-TERM CURRENT USE OF INSULIN: ICD-10-CM

## 2023-01-23 DIAGNOSIS — E78.00 HYPERCHOLESTEREMIA: ICD-10-CM

## 2023-01-23 DIAGNOSIS — R00.1 BRADYCARDIA: ICD-10-CM

## 2023-01-23 PROCEDURE — 3074F PR MOST RECENT SYSTOLIC BLOOD PRESSURE < 130 MM HG: ICD-10-PCS | Mod: CPTII,,,

## 2023-01-23 PROCEDURE — 93010 EKG 12-LEAD: ICD-10-PCS | Mod: S$PBB,,, | Performed by: INTERNAL MEDICINE

## 2023-01-23 PROCEDURE — 99204 PR OFFICE/OUTPT VISIT, NEW, LEVL IV, 45-59 MIN: ICD-10-PCS | Mod: 25,S$PBB,,

## 2023-01-23 PROCEDURE — 99999 PR PBB SHADOW E&M-EST. PATIENT-LVL III: CPT | Mod: PBBFAC,,,

## 2023-01-23 PROCEDURE — 3078F DIAST BP <80 MM HG: CPT | Mod: CPTII,,,

## 2023-01-23 PROCEDURE — 1159F PR MEDICATION LIST DOCUMENTED IN MEDICAL RECORD: ICD-10-PCS | Mod: CPTII,,,

## 2023-01-23 PROCEDURE — 1159F MED LIST DOCD IN RCRD: CPT | Mod: CPTII,,,

## 2023-01-23 PROCEDURE — 3074F SYST BP LT 130 MM HG: CPT | Mod: CPTII,,,

## 2023-01-23 PROCEDURE — 1160F RVW MEDS BY RX/DR IN RCRD: CPT | Mod: CPTII,,,

## 2023-01-23 PROCEDURE — 93010 ELECTROCARDIOGRAM REPORT: CPT | Mod: S$PBB,,, | Performed by: INTERNAL MEDICINE

## 2023-01-23 PROCEDURE — 99204 OFFICE O/P NEW MOD 45 MIN: CPT | Mod: 25,S$PBB,,

## 2023-01-23 PROCEDURE — 3008F PR BODY MASS INDEX (BMI) DOCUMENTED: ICD-10-PCS | Mod: CPTII,,,

## 2023-01-23 PROCEDURE — 3008F BODY MASS INDEX DOCD: CPT | Mod: CPTII,,,

## 2023-01-23 PROCEDURE — 99213 OFFICE O/P EST LOW 20 MIN: CPT | Mod: PBBFAC,PN

## 2023-01-23 PROCEDURE — 99999 PR PBB SHADOW E&M-EST. PATIENT-LVL III: ICD-10-PCS | Mod: PBBFAC,,,

## 2023-01-23 PROCEDURE — 3078F PR MOST RECENT DIASTOLIC BLOOD PRESSURE < 80 MM HG: ICD-10-PCS | Mod: CPTII,,,

## 2023-01-23 PROCEDURE — 93005 ELECTROCARDIOGRAM TRACING: CPT | Mod: PBBFAC,PN | Performed by: INTERNAL MEDICINE

## 2023-01-23 PROCEDURE — 1160F PR REVIEW ALL MEDS BY PRESCRIBER/CLIN PHARMACIST DOCUMENTED: ICD-10-PCS | Mod: CPTII,,,

## 2023-01-23 NOTE — ASSESSMENT & PLAN NOTE
-Goal BP < 130/80  -controlled   -continue medication regimen: amlodipine 10 mh  -monitor home BP twice daily and maintain log  -discussed lifestyle modifications and benefits: diet, exercise, weight loss

## 2023-01-23 NOTE — ASSESSMENT & PLAN NOTE
-mid-sternal CP increase in intensity with exertion  -Treadmill stress test to evaluate for ischemia

## 2023-01-23 NOTE — PROGRESS NOTES
Subjective:    Patient ID:  Marylee Breon Robinson is a 32 y.o. female who presents for evaluation of Bradycardia      PCP: Miguelina Johnson NP     Referring Provider: Miguelina Manley MD    HPI: Patient ios a 33 yo F w/ PMH of HTN,HLD, anxiety, DM-type2, tobacco abuse (1/2 pk/ QOD X 4 yrs), iron deficiency anemia who presents for initial evaluation post ED visit. She was seen in ED on 22 w c/o of N/V, abdominal pain. EKG reviewed S-bradycardia HR 51 w incomplete RBBB no ST-T wave abnormalities, troponin negative. She received IV fluids and Zofran and was discharged w PCP f/u. Patient notes mid-sternal CP dull, lasting 8-10 minute and increases in intensity with activity. She also reports SOB w exertion. She also report right arm pain.  Patient denies palpitations, orthopnea, PND, pre-syncope, LOC, swelling, or claudication. She notes medication compliance without side effects. She does not monitor her home BP. Patient notes regular exercise and remains active with walking. She notes dietary compliance with los Salt diet.     Past Medical History:   Diagnosis Date    Diabetes mellitus     Endometriosis     Hyperlipidemia     Hypertension      Past Surgical History:   Procedure Laterality Date     SECTION, CLASSIC      ESOPHAGOGASTRODUODENOSCOPY N/A 2021    Procedure: EGD (ESOPHAGOGASTRODUODENOSCOPY);  Surgeon: Tim Mcneil MD;  Location: [facility];  Service: Endoscopy;  Laterality: N/A;     Social History     Socioeconomic History    Marital status:    Tobacco Use    Smoking status: Former    Smokeless tobacco: Never   Substance and Sexual Activity    Alcohol use: No    Drug use: No    Sexual activity: Yes     Family History   Problem Relation Age of Onset    Diabetes Maternal Grandmother     Hypertension Maternal Grandmother     Diabetes Paternal Grandmother     Hypertension Paternal Grandmother        Review of patient's allergies indicates:   Allergen Reactions    Ibuprofen      Lisinopril Other (See Comments)    Vicodin [hydrocodone-acetaminophen] Hives     Patient states that she can take percocet       Medication List with Changes/Refills   Current Medications    AMLODIPINE (NORVASC) 10 MG TABLET    Take 10 mg by mouth once daily.    DICLOFENAC SODIUM 1 % GEL    Apply 2 g topically 4 (four) times daily.    ESCITALOPRAM OXALATE (LEXAPRO) 10 MG TABLET    escitalopram 10 mg tablet   TAKE 1 TABLET BY MOUTH EVERY DAY    FAMOTIDINE (PEPCID) 20 MG TABLET    Take 1 tablet (20 mg total) by mouth every evening.    INSULIN (LANTUS SOLOSTAR U-100 INSULIN) GLARGINE 100 UNITS/ML (3ML) SUBQ PEN    Lantus Solostar U-100 Insulin 100 unit/mL (3 mL) subcutaneous pen    INSULIN ASPART PROTAMINE-INSULIN ASPART (NOVOLOG 70/30) 100 UNIT/ML (70-30) INPN PEN    Inject 35 Units into the skin 2 (two) times daily before meals.     PRAVASTATIN (PRAVACHOL) 40 MG TABLET    TK 1 T PO  QPM   Discontinued Medications    ATORVASTATIN (LIPITOR) 10 MG TABLET    Take 10 mg by mouth once daily.    KETOROLAC (TORADOL) 10 MG TABLET    Take 1 tablet (10 mg total) by mouth every 6 (six) hours as needed for Pain.    METOCLOPRAMIDE HCL (REGLAN) 10 MG TABLET    Take 1 tablet (10 mg total) by mouth every 6 (six) hours.    ONDANSETRON (ZOFRAN-ODT) 4 MG TBDL    Take 1 tablet (4 mg total) by mouth every 8 (eight) hours as needed.       Review of Systems   Constitutional: Negative for diaphoresis and fever.   HENT:  Negative for congestion and hearing loss.    Eyes:  Negative for blurred vision and pain.   Cardiovascular:  Positive for chest pain and dyspnea on exertion. Negative for claudication, leg swelling, near-syncope, palpitations and syncope.   Respiratory:  Positive for cough. Negative for shortness of breath and sleep disturbances due to breathing.    Hematologic/Lymphatic: Negative for bleeding problem. Does not bruise/bleed easily.   Skin:  Negative for color change and poor wound healing.   Gastrointestinal:  Negative for  "abdominal pain and nausea.   Genitourinary:  Negative for bladder incontinence and flank pain.   Neurological:  Negative for focal weakness and light-headedness.      Objective:   /62   Pulse 64   Ht 5' 2" (1.575 m)   Wt 72.5 kg (159 lb 12.8 oz)   SpO2 98%   BMI 29.23 kg/m²    Physical Exam  Constitutional:       Appearance: She is well-developed. She is not diaphoretic.   HENT:      Head: Normocephalic and atraumatic.   Eyes:      General: No scleral icterus.     Pupils: Pupils are equal, round, and reactive to light.   Neck:      Vascular: No JVD.   Cardiovascular:      Rate and Rhythm: Normal rate and regular rhythm.      Pulses: Intact distal pulses.           Radial pulses are 2+ on the right side and 2+ on the left side.        Dorsalis pedis pulses are 2+ on the right side and 2+ on the left side.        Posterior tibial pulses are 2+ on the right side and 2+ on the left side.      Heart sounds: S1 normal and S2 normal. No murmur heard.    No friction rub. No gallop.   Pulmonary:      Effort: Pulmonary effort is normal. No respiratory distress.      Breath sounds: Normal breath sounds. No wheezing or rales.   Chest:      Chest wall: No tenderness.   Abdominal:      General: Bowel sounds are normal. There is no distension.      Palpations: Abdomen is soft. There is no mass.      Tenderness: There is no abdominal tenderness. There is no rebound.   Musculoskeletal:         General: No tenderness. Normal range of motion.      Cervical back: Normal range of motion and neck supple.   Skin:     General: Skin is warm and dry.      Coloration: Skin is not pale.   Neurological:      Mental Status: She is alert and oriented to person, place, and time.      Coordination: Coordination normal.      Deep Tendon Reflexes: Reflexes normal.   Psychiatric:         Behavior: Behavior normal.         Judgment: Judgment normal.         Assessment:       1. Bradycardia    2. Type 2 diabetes mellitus without complication, " with long-term current use of insulin    3. Hypercholesteremia    4. Essential hypertension    5. Other chest pain    6. SOBOE (shortness of breath on exertion)         Plan:         Type 2 diabetes mellitus without complication, with long-term current use of insulin  -last A1c 7.1 11/4/2021  -Followed by PCP  -continue medication regimen     Hypercholesteremia  -LDL goal < 70  -Last LDL 90 6/18/2021  -continue statin therapy- pravastatin 40 mg      Essential hypertension  -Goal BP < 130/80  -continue medication regimen: amlodipine 10 mh  -monitor home BP twice daily and maintain log  -discussed lifestyle modifications and benefits: diet, exercise, weight loss     Other chest pain  -mid-sternal CP increase in intensity with exertion  -Treadmill stress test to evaluate for ischemia     SOBOE (shortness of breath on exertion)  -Cardiac echo to evaluate heart function 2/2 SOB with exertion     Bradycardia  -EKG 12/30/22- Sinus bradycardia w incomplete RBBB, no ST-T wave abnormalities   -repeat EKG       Total duration of face to face visit time 30 minutes.  Total time spent counseling greater than fifty percent of total visit time.  Counseling included discussion regarding imaging findings, diagnosis, possibilities, treatment options, risks and benefits.  The patient had many questions regarding the options and long-term effects      Ryan Condon NP  Cardiology

## 2023-01-31 ENCOUNTER — OFFICE VISIT (OUTPATIENT)
Dept: UROLOGY | Facility: CLINIC | Age: 33
End: 2023-01-31
Payer: MEDICAID

## 2023-01-31 VITALS
BODY MASS INDEX: 29.59 KG/M2 | DIASTOLIC BLOOD PRESSURE: 91 MMHG | HEART RATE: 61 BPM | WEIGHT: 160.81 LBS | SYSTOLIC BLOOD PRESSURE: 147 MMHG | HEIGHT: 62 IN

## 2023-01-31 DIAGNOSIS — R31.0 GROSS HEMATURIA: Primary | ICD-10-CM

## 2023-01-31 DIAGNOSIS — R10.9 RIGHT FLANK PAIN: ICD-10-CM

## 2023-01-31 DIAGNOSIS — R11.0 NAUSEA: ICD-10-CM

## 2023-01-31 LAB
BILIRUB UR QL STRIP: NEGATIVE
CLARITY UR REFRACT.AUTO: ABNORMAL
COLOR UR AUTO: YELLOW
GLUCOSE UR QL STRIP: ABNORMAL
HGB UR QL STRIP: NEGATIVE
KETONES UR QL STRIP: NEGATIVE
LEUKOCYTE ESTERASE UR QL STRIP: NEGATIVE
NITRITE UR QL STRIP: NEGATIVE
PH UR STRIP: 7 [PH] (ref 5–8)
PROT UR QL STRIP: NEGATIVE
SP GR UR STRIP: 1.02 (ref 1–1.03)
URN SPEC COLLECT METH UR: ABNORMAL

## 2023-01-31 PROCEDURE — 3077F PR MOST RECENT SYSTOLIC BLOOD PRESSURE >= 140 MM HG: ICD-10-PCS | Mod: CPTII,,, | Performed by: NURSE PRACTITIONER

## 2023-01-31 PROCEDURE — 3080F DIAST BP >= 90 MM HG: CPT | Mod: CPTII,,, | Performed by: NURSE PRACTITIONER

## 2023-01-31 PROCEDURE — 99214 OFFICE O/P EST MOD 30 MIN: CPT | Mod: S$PBB,,, | Performed by: NURSE PRACTITIONER

## 2023-01-31 PROCEDURE — 99214 OFFICE O/P EST MOD 30 MIN: CPT | Mod: PBBFAC,PO | Performed by: NURSE PRACTITIONER

## 2023-01-31 PROCEDURE — 3077F SYST BP >= 140 MM HG: CPT | Mod: CPTII,,, | Performed by: NURSE PRACTITIONER

## 2023-01-31 PROCEDURE — 1159F PR MEDICATION LIST DOCUMENTED IN MEDICAL RECORD: ICD-10-PCS | Mod: CPTII,,, | Performed by: NURSE PRACTITIONER

## 2023-01-31 PROCEDURE — 1160F PR REVIEW ALL MEDS BY PRESCRIBER/CLIN PHARMACIST DOCUMENTED: ICD-10-PCS | Mod: CPTII,,, | Performed by: NURSE PRACTITIONER

## 2023-01-31 PROCEDURE — 99214 PR OFFICE/OUTPT VISIT, EST, LEVL IV, 30-39 MIN: ICD-10-PCS | Mod: S$PBB,,, | Performed by: NURSE PRACTITIONER

## 2023-01-31 PROCEDURE — 1159F MED LIST DOCD IN RCRD: CPT | Mod: CPTII,,, | Performed by: NURSE PRACTITIONER

## 2023-01-31 PROCEDURE — 87086 URINE CULTURE/COLONY COUNT: CPT | Performed by: NURSE PRACTITIONER

## 2023-01-31 PROCEDURE — 3008F PR BODY MASS INDEX (BMI) DOCUMENTED: ICD-10-PCS | Mod: CPTII,,, | Performed by: NURSE PRACTITIONER

## 2023-01-31 PROCEDURE — 81003 URINALYSIS AUTO W/O SCOPE: CPT | Performed by: NURSE PRACTITIONER

## 2023-01-31 PROCEDURE — 1160F RVW MEDS BY RX/DR IN RCRD: CPT | Mod: CPTII,,, | Performed by: NURSE PRACTITIONER

## 2023-01-31 PROCEDURE — 3080F PR MOST RECENT DIASTOLIC BLOOD PRESSURE >= 90 MM HG: ICD-10-PCS | Mod: CPTII,,, | Performed by: NURSE PRACTITIONER

## 2023-01-31 PROCEDURE — 3008F BODY MASS INDEX DOCD: CPT | Mod: CPTII,,, | Performed by: NURSE PRACTITIONER

## 2023-01-31 PROCEDURE — 99999 PR PBB SHADOW E&M-EST. PATIENT-LVL IV: ICD-10-PCS | Mod: PBBFAC,,, | Performed by: NURSE PRACTITIONER

## 2023-01-31 PROCEDURE — 99999 PR PBB SHADOW E&M-EST. PATIENT-LVL IV: CPT | Mod: PBBFAC,,, | Performed by: NURSE PRACTITIONER

## 2023-01-31 RX ORDER — METOCLOPRAMIDE 10 MG/1
10 TABLET ORAL 4 TIMES DAILY PRN
Qty: 28 TABLET | Refills: 0 | Status: SHIPPED | OUTPATIENT
Start: 2023-01-31 | End: 2023-02-07

## 2023-01-31 NOTE — PROGRESS NOTES
Subjective:       Patient ID: Marylee Breon Robinson is a 32 y.o. female.    Chief Complaint: Hematuria    Patient is new to me. She is a 31 yo AAF who is here today for evaluation of chronic microscopic hematuria. Patient also reports seeing a small amount of blood in her urine approximately 2 weeks ago. She reports intermittent back pain (right flank, lumbar spine, and bilateral upper back). Patient denies personal hx of nephrolithiasis     Hematuria  This is a chronic problem. The current episode started more than 1 month ago. The problem is unchanged. She describes the hematuria as microscopic (was gross) hematuria. She reports no clotting in her urine stream. Her pain is at a severity of 4/10 (back pain). The pain is moderate. She describes her urine color as yellow (was light pink). Irritative symptoms do not include frequency, nocturia or urgency. Associated symptoms include flank pain (right) and nausea. Pertinent negatives include no abdominal pain, chills, dysuria, facial swelling, fever, genital pain, hesitancy, inability to urinate or vomiting. Her past medical history is significant for hypertension. There is no history of  trauma or kidney stones.   Review of Systems   Constitutional:  Negative for chills, fatigue and fever.   HENT:  Negative for facial swelling.    Gastrointestinal:  Positive for nausea. Negative for abdominal pain, change in bowel habit, constipation, diarrhea, vomiting and change in bowel habit.   Genitourinary:  Positive for flank pain (right) and hematuria. Negative for decreased urine volume, difficulty urinating, dysuria, frequency, hesitancy, nocturia and urgency.   Musculoskeletal:  Positive for back pain (Bilateral upper back and lower lumbar spine).   Neurological:  Negative for dizziness, weakness and headaches.   Psychiatric/Behavioral: Negative.         Objective:      Physical Exam  Vitals and nursing note reviewed.   Constitutional:       General: She is not in acute  distress.     Appearance: She is well-developed. She is not ill-appearing.   HENT:      Head: Normocephalic and atraumatic.   Eyes:      Pupils: Pupils are equal, round, and reactive to light.   Cardiovascular:      Rate and Rhythm: Normal rate.   Pulmonary:      Effort: Pulmonary effort is normal. No respiratory distress.   Abdominal:      Palpations: Abdomen is soft.      Tenderness: There is no abdominal tenderness.   Musculoskeletal:         General: Normal range of motion.      Cervical back: Normal range of motion.   Skin:     General: Skin is warm and dry.   Neurological:      Mental Status: She is alert and oriented to person, place, and time.      Coordination: Coordination normal.   Psychiatric:         Mood and Affect: Mood normal.         Behavior: Behavior normal.         Thought Content: Thought content normal.         Judgment: Judgment normal.       Assessment:       Problem List Items Addressed This Visit    None  Visit Diagnoses       Gross hematuria    -  Primary    Relevant Orders    Urine culture    Urinalysis    CT Renal Stone Study ABD Pelvis WO (Completed)    Right flank pain        Relevant Orders    Urine culture    Urinalysis    CT Renal Stone Study ABD Pelvis WO (Completed)    Nausea        Relevant Medications    metoclopramide HCl (REGLAN) 10 MG tablet              Plan:           Marylee was seen today for hematuria.    Diagnoses and all orders for this visit:    Gross hematuria  -     Urine culture  -     Urinalysis  -     CT Renal Stone Study ABD Pelvis WO; Future    Right flank pain  -     Urine culture  -     Urinalysis  -     CT Renal Stone Study ABD Pelvis WO; Future    Nausea  -     metoclopramide HCl (REGLAN) 10 MG tablet; Take 1 tablet (10 mg total) by mouth 4 (four) times daily as needed (nausea).    Follow-up pending urine cx and CT results.     Sima Jang NP

## 2023-02-01 LAB
BACTERIA UR CULT: NORMAL
BACTERIA UR CULT: NORMAL

## 2023-02-07 ENCOUNTER — TELEPHONE (OUTPATIENT)
Dept: CARDIOLOGY | Facility: CLINIC | Age: 33
End: 2023-02-07
Payer: MEDICAID

## 2023-02-07 ENCOUNTER — TELEPHONE (OUTPATIENT)
Dept: UROLOGY | Facility: CLINIC | Age: 33
End: 2023-02-07
Payer: MEDICAID

## 2023-02-07 DIAGNOSIS — R07.9 CHEST PAIN, UNSPECIFIED TYPE: Primary | ICD-10-CM

## 2023-02-07 DIAGNOSIS — R94.39 POSITIVE CARDIAC STRESS TEST: ICD-10-CM

## 2023-02-07 NOTE — TELEPHONE ENCOUNTER
----- Message from Ryan Condon NP sent at 2/7/2023  4:25 PM CST -----  Please inform Ms. Lee that  her heart function is within normal limits on her cardiac echo.  Thanks, Ryan

## 2023-02-07 NOTE — TELEPHONE ENCOUNTER
----- Message from Sima Jang NP sent at 2/7/2023 12:58 PM CST -----  Please inform patient via telephone that her CT RSS was normal from a urology standpoint. However, CT showed prominent appearance of the bilateral ovaries, nonspecific. Radiologist recommended pelvic ultrasound for further evaluation. Patient should follow-up with her OBGYN for this. Thanks. I will follow-up with patient after she repeats urine cx.

## 2023-02-09 ENCOUNTER — TELEPHONE (OUTPATIENT)
Dept: CARDIOLOGY | Facility: CLINIC | Age: 33
End: 2023-02-09
Payer: MEDICAID

## 2023-02-09 NOTE — TELEPHONE ENCOUNTER
----- Message from RT Rob sent at 2/9/2023  6:38 AM CST -----  Regarding: RE: nm stress  2/22 @ 915. Please have them npo, lovelys.  ----- Message -----  From: Selma Lockhart MA  Sent: 2/7/2023   4:37 PM CST  To: RT Rob  Subject: nm stress                                        Please set up pt's nm stress, pt will be out of town from feb 11-18th. Thank you!

## 2023-04-24 ENCOUNTER — OFFICE VISIT (OUTPATIENT)
Dept: CARDIOLOGY | Facility: CLINIC | Age: 33
End: 2023-04-24
Payer: MEDICAID

## 2023-04-24 VITALS
HEART RATE: 69 BPM | BODY MASS INDEX: 28.35 KG/M2 | OXYGEN SATURATION: 100 % | DIASTOLIC BLOOD PRESSURE: 78 MMHG | WEIGHT: 155 LBS | SYSTOLIC BLOOD PRESSURE: 134 MMHG

## 2023-04-24 DIAGNOSIS — E11.9 TYPE 2 DIABETES MELLITUS WITHOUT COMPLICATION, WITH LONG-TERM CURRENT USE OF INSULIN: ICD-10-CM

## 2023-04-24 DIAGNOSIS — R07.89 OTHER CHEST PAIN: ICD-10-CM

## 2023-04-24 DIAGNOSIS — Z79.4 TYPE 2 DIABETES MELLITUS WITHOUT COMPLICATION, WITH LONG-TERM CURRENT USE OF INSULIN: ICD-10-CM

## 2023-04-24 DIAGNOSIS — E78.00 HYPERCHOLESTEREMIA: ICD-10-CM

## 2023-04-24 DIAGNOSIS — R06.02 SOBOE (SHORTNESS OF BREATH ON EXERTION): ICD-10-CM

## 2023-04-24 DIAGNOSIS — I10 ESSENTIAL HYPERTENSION: ICD-10-CM

## 2023-04-24 PROCEDURE — 3075F SYST BP GE 130 - 139MM HG: CPT | Mod: CPTII,,,

## 2023-04-24 PROCEDURE — 3008F PR BODY MASS INDEX (BMI) DOCUMENTED: ICD-10-PCS | Mod: CPTII,,,

## 2023-04-24 PROCEDURE — 99214 OFFICE O/P EST MOD 30 MIN: CPT | Mod: S$PBB,,,

## 2023-04-24 PROCEDURE — 99213 OFFICE O/P EST LOW 20 MIN: CPT | Mod: PBBFAC,PN

## 2023-04-24 PROCEDURE — 1160F PR REVIEW ALL MEDS BY PRESCRIBER/CLIN PHARMACIST DOCUMENTED: ICD-10-PCS | Mod: CPTII,,,

## 2023-04-24 PROCEDURE — 1159F PR MEDICATION LIST DOCUMENTED IN MEDICAL RECORD: ICD-10-PCS | Mod: CPTII,,,

## 2023-04-24 PROCEDURE — 3075F PR MOST RECENT SYSTOLIC BLOOD PRESS GE 130-139MM HG: ICD-10-PCS | Mod: CPTII,,,

## 2023-04-24 PROCEDURE — 3008F BODY MASS INDEX DOCD: CPT | Mod: CPTII,,,

## 2023-04-24 PROCEDURE — 99214 PR OFFICE/OUTPT VISIT, EST, LEVL IV, 30-39 MIN: ICD-10-PCS | Mod: S$PBB,,,

## 2023-04-24 PROCEDURE — 3078F PR MOST RECENT DIASTOLIC BLOOD PRESSURE < 80 MM HG: ICD-10-PCS | Mod: CPTII,,,

## 2023-04-24 PROCEDURE — 99999 PR PBB SHADOW E&M-EST. PATIENT-LVL III: CPT | Mod: PBBFAC,,,

## 2023-04-24 PROCEDURE — 99999 PR PBB SHADOW E&M-EST. PATIENT-LVL III: ICD-10-PCS | Mod: PBBFAC,,,

## 2023-04-24 PROCEDURE — 1159F MED LIST DOCD IN RCRD: CPT | Mod: CPTII,,,

## 2023-04-24 PROCEDURE — 1160F RVW MEDS BY RX/DR IN RCRD: CPT | Mod: CPTII,,,

## 2023-04-24 PROCEDURE — 3078F DIAST BP <80 MM HG: CPT | Mod: CPTII,,,

## 2023-04-24 RX ORDER — PRAVASTATIN SODIUM 40 MG/1
40 TABLET ORAL NIGHTLY
Qty: 90 TABLET | Refills: 3 | Status: SHIPPED | OUTPATIENT
Start: 2023-04-24

## 2023-04-24 RX ORDER — AMLODIPINE BESYLATE 10 MG/1
10 TABLET ORAL DAILY
Qty: 90 TABLET | Refills: 3 | Status: SHIPPED | OUTPATIENT
Start: 2023-04-24

## 2023-04-24 NOTE — PROGRESS NOTES
-Subjective:    Patient ID:  Marylee Breon Robinson is a 32 y.o. female who presents for evaluation of Follow-up      PCP: Miguelina Johnson NP     Referring Provider: Miguelina Manley MD    HPI: Patient ios a 31 yo F w/ PMH of HTN,HLD, anxiety, DM-type2, tobacco abuse (1/2 pk/ QOD X 4 yrs), iron deficiency anemia who presents today for f/u appt. She was last seen on 23  for initial evaluation post ED visit. She was seen in ED on 22 w c/o of N/V, abdominal pain. EKG reviewed S-bradycardia HR 51 w incomplete RBBB no ST-T wave abnormalities, troponin negative. She received IV fluids and Zofran and was discharged w PCP f/u. Patient notes mid-sternal CP dull, lasting 8-10 minute and increases in intensity with activity. She also reports SOB w exertion. She also report right arm pain.  Patient denies palpitations, orthopnea, PND, pre-syncope, LOC, swelling, or claudication. She notes medication compliance without side effects. She does not monitor her home BP. Patient notes regular exercise and remains active with walking. She notes dietary compliance with los Salt diet.     Interim Hx: 23: Patient presents today for f/u appt. She was last seen on 23 post ED visit, and  abnormal EKG: S-bradycardia HR 51 w incomplete RBBB no ST-T wave abnormalities. Exercise stress test positive. NM stress test negative for ischemia. Cardiac echo w normal LVEF 60%. Patient denies palpitations, orthopnea, PND, pre-syncope, LOC, swelling, or claudication. She notes medication compliance without side effects. She does not monitor her home BP. Patient notes regular exercise and remains active with walking. She notes dietary compliance with los Salt diet.     Past Medical History:   Diagnosis Date    Diabetes mellitus     Endometriosis     Hyperlipidemia     Hypertension      Past Surgical History:   Procedure Laterality Date     SECTION, CLASSIC      ESOPHAGOGASTRODUODENOSCOPY N/A 2021    Procedure: EGD  (ESOPHAGOGASTRODUODENOSCOPY);  Surgeon: Tim Mcneil MD;  Location: [facility];  Service: Endoscopy;  Laterality: N/A;     Social History     Socioeconomic History    Marital status:    Tobacco Use    Smoking status: Former    Smokeless tobacco: Never   Substance and Sexual Activity    Alcohol use: No    Drug use: No    Sexual activity: Yes     Family History   Problem Relation Age of Onset    Diabetes Maternal Grandmother     Hypertension Maternal Grandmother     Diabetes Paternal Grandmother     Hypertension Paternal Grandmother        Review of patient's allergies indicates:   Allergen Reactions    Ibuprofen     Lisinopril Other (See Comments)    Vicodin [hydrocodone-acetaminophen] Hives     Patient states that she can take percocet       Medication List with Changes/Refills   Current Medications    AMLODIPINE (NORVASC) 10 MG TABLET    Take 10 mg by mouth once daily.    DICLOFENAC SODIUM 1 % GEL    Apply 2 g topically 4 (four) times daily.    ESCITALOPRAM OXALATE (LEXAPRO) 10 MG TABLET    escitalopram 10 mg tablet   TAKE 1 TABLET BY MOUTH EVERY DAY    FAMOTIDINE (PEPCID) 20 MG TABLET    Take 1 tablet (20 mg total) by mouth every evening.    INSULIN (LANTUS SOLOSTAR U-100 INSULIN) GLARGINE 100 UNITS/ML (3ML) SUBQ PEN    Lantus Solostar U-100 Insulin 100 unit/mL (3 mL) subcutaneous pen    INSULIN ASPART PROTAMINE-INSULIN ASPART (NOVOLOG 70/30) 100 UNIT/ML (70-30) INPN PEN    Inject 35 Units into the skin 2 (two) times daily before meals.     PRAVASTATIN (PRAVACHOL) 40 MG TABLET    TK 1 T PO  QPM       Review of Systems   Constitutional: Negative for diaphoresis and fever.   HENT:  Negative for congestion and hearing loss.    Eyes:  Negative for blurred vision and pain.   Cardiovascular:  Negative for claudication, leg swelling, near-syncope, palpitations and syncope.   Respiratory:  Negative for shortness of breath and sleep disturbances due to breathing.    Hematologic/Lymphatic: Negative for  bleeding problem. Does not bruise/bleed easily.   Skin:  Negative for color change and poor wound healing.   Gastrointestinal:  Negative for abdominal pain and nausea.   Genitourinary:  Negative for bladder incontinence and flank pain.   Neurological:  Negative for focal weakness and light-headedness.      Objective:   /78   Pulse 69   Wt 70.3 kg (155 lb)   SpO2 100%   BMI 28.35 kg/m²    Physical Exam  Constitutional:       Appearance: She is well-developed. She is not diaphoretic.   HENT:      Head: Normocephalic and atraumatic.   Eyes:      General: No scleral icterus.     Pupils: Pupils are equal, round, and reactive to light.   Neck:      Vascular: No JVD.   Cardiovascular:      Rate and Rhythm: Normal rate and regular rhythm.      Pulses: Intact distal pulses.           Radial pulses are 2+ on the right side and 2+ on the left side.        Dorsalis pedis pulses are 2+ on the right side and 2+ on the left side.        Posterior tibial pulses are 2+ on the right side and 2+ on the left side.      Heart sounds: S1 normal and S2 normal. No murmur heard.    No friction rub. No gallop.   Pulmonary:      Effort: Pulmonary effort is normal. No respiratory distress.      Breath sounds: Normal breath sounds. No wheezing or rales.   Chest:      Chest wall: No tenderness.   Abdominal:      General: Bowel sounds are normal. There is no distension.      Palpations: Abdomen is soft. There is no mass.      Tenderness: There is no abdominal tenderness. There is no rebound.   Musculoskeletal:         General: No tenderness. Normal range of motion.      Cervical back: Normal range of motion and neck supple.   Skin:     General: Skin is warm and dry.      Coloration: Skin is not pale.   Neurological:      Mental Status: She is alert and oriented to person, place, and time.      Coordination: Coordination normal.      Deep Tendon Reflexes: Reflexes normal.   Psychiatric:         Behavior: Behavior normal.          Judgment: Judgment normal.     EKG- 1/23/23- S-candy, Premature atrial complexes   Incomplete right bundle branch block     Cardiac echo 2/7/23:  Summary    The left ventricle is normal in size with concentric remodeling and normal systolic function.  The estimated ejection fraction is 60%.  Normal left ventricular diastolic function.  Normal right ventricular size with normal right ventricular systolic function.  Normal central venous pressure (3 mmHg).  The estimated PA systolic pressure is 17 mmHg.    NM Stress test 2/22/23  Conclusion         The ECG portion of the study is negative for ischemia.    The patient reported chest pain during the stress test.    There were no arrhythmias during stress.    The nuclear portion of this study will be reported separately.  Impression:     1. Scintigraphically negative for ischemia or infarct.  2. The global left ventricular systolic function is normal with an LV ejection fraction of 69 % and no evidence of LV dilatation. Wall motion is normal.      Exercise stress test: 2/7/23:   Conclusion         The patient exercised for 9 minutes 31 seconds on a Guillermo protocol, corresponding to a functional capacity of 11 METS, achieving a peak heart rate of 179 bpm, which is 100 % of the age predicted maximum heart rate. The patient experienced no angina during the test. Their exercise capacity was normal. The [place] Treadmill Score was 5.    The ECG portion of the study is positive for ischemia. Specificity is reduced secondary to hypertensive response.    Specificity is reduced secondary to hypertensive response.    The patient reported no chest pain during the stress test.    The Duke Treadmill Score was 5.    There were no arrhythmias during stress.    The exercise capacity was normal.  Assessment:       1. Essential hypertension    2. Hypercholesteremia    3. Other chest pain    4. SOBOE (shortness of breath on exertion)    5. Type 2 diabetes mellitus without complication, with  long-term current use of insulin           Plan:         Essential hypertension  -Goal BP < 130/80  -controlled   -continue medication regimen: amlodipine 10 mg  -monitor home BP twice daily and maintain log  -discussed lifestyle modifications and benefits: diet, exercise, weight loss     Hypercholesteremia  -LDL goal < 70  -Last LDL 90 6/18/2021  -continue statin therapy- pravastatin 40 mg      Other chest pain  -mid-sternal CP increase in intensity with exertion  -Treadmill stress test 2/7/23-positive  -repeat NM stress test done-2/22/23-negative     SOBOE (shortness of breath on exertion)  -Cardiac echo 2/7/23-LVEF 60%    Type 2 diabetes mellitus without complication, with long-term current use of insulin  -last A1c 7.1 11/4/2021  -Followed by PCP  -continue medication regimen         Total duration of face to face visit time 30 minutes.  Total time spent counseling greater than fifty percent of total visit time.  Counseling included discussion regarding imaging findings, diagnosis, possibilities, treatment options, risks and benefits.  The patient had many questions regarding the options and long-term effects      Ryan Condon NP  Cardiology

## 2023-04-24 NOTE — ASSESSMENT & PLAN NOTE
-mid-sternal CP increase in intensity with exertion  -Treadmill stress test 2/7/23-positive  -repeat NM stress test done-2/22/23-negative

## 2023-04-24 NOTE — ASSESSMENT & PLAN NOTE
-Goal BP < 130/80  -controlled   -continue medication regimen: amlodipine 10 mg  -monitor home BP twice daily and maintain log  -discussed lifestyle modifications and benefits: diet, exercise, weight loss

## 2024-04-23 DIAGNOSIS — I10 ESSENTIAL HYPERTENSION: ICD-10-CM

## 2024-04-24 RX ORDER — AMLODIPINE BESYLATE 10 MG/1
10 TABLET ORAL
Qty: 90 TABLET | Refills: 3 | Status: SHIPPED | OUTPATIENT
Start: 2024-04-24

## 2024-05-23 ENCOUNTER — PATIENT MESSAGE (OUTPATIENT)
Dept: CARDIOLOGY | Facility: CLINIC | Age: 34
End: 2024-05-23
Payer: MEDICAID

## 2024-05-23 DIAGNOSIS — E78.00 HYPERCHOLESTEREMIA: ICD-10-CM

## 2024-05-23 RX ORDER — PRAVASTATIN SODIUM 40 MG/1
40 TABLET ORAL NIGHTLY
Qty: 90 TABLET | Refills: 0 | Status: SHIPPED | OUTPATIENT
Start: 2024-05-23

## 2024-05-23 NOTE — TELEPHONE ENCOUNTER
LOV-4/24/2023-Ryan Condon    Reached out to Ms Marylee to schedule an appointment. [facility] for her to call back,

## 2024-06-27 ENCOUNTER — TELEPHONE (OUTPATIENT)
Dept: CARDIOLOGY | Facility: CLINIC | Age: 34
End: 2024-06-27
Payer: MEDICAID

## 2024-06-27 DIAGNOSIS — I10 ESSENTIAL HYPERTENSION: ICD-10-CM

## 2024-06-27 DIAGNOSIS — E78.00 HYPERCHOLESTEREMIA: ICD-10-CM

## 2024-06-27 RX ORDER — AMLODIPINE BESYLATE 10 MG/1
10 TABLET ORAL DAILY
Qty: 90 TABLET | Refills: 0 | Status: SHIPPED | OUTPATIENT
Start: 2024-06-27

## 2024-06-27 RX ORDER — PRAVASTATIN SODIUM 40 MG/1
40 TABLET ORAL NIGHTLY
Qty: 90 TABLET | Refills: 0 | Status: SHIPPED | OUTPATIENT
Start: 2024-06-27

## 2024-06-27 NOTE — TELEPHONE ENCOUNTER
Pt is a refugee from [place] and is staying else where until she can get back in her house. She will  not be late.     Thank you,    Devora Ochoa  Medical Assistant      ----- Message from Zandra Way sent at 6/27/2024 11:09 AM CDT -----  Type:  Needs Medical Advice/running late     Who Called: pt    Would the patient rather a call back or a response via [facility]? Call   Best Call Back Number: [phone]  Additional Information: pt running late for 2 contact patient if she need to reschedule

## 2024-08-07 ENCOUNTER — OFFICE VISIT (OUTPATIENT)
Facility: CLINIC | Age: 34
End: 2024-08-07
Payer: MEDICAID

## 2024-08-07 VITALS
SYSTOLIC BLOOD PRESSURE: 138 MMHG | WEIGHT: 154.88 LBS | DIASTOLIC BLOOD PRESSURE: 62 MMHG | BODY MASS INDEX: 27.43 KG/M2

## 2024-08-07 DIAGNOSIS — Z01.419 WELL WOMAN EXAM WITH ROUTINE GYNECOLOGICAL EXAM: Primary | ICD-10-CM

## 2024-08-07 DIAGNOSIS — Z12.4 ENCOUNTER FOR SCREENING FOR MALIGNANT NEOPLASM OF CERVIX: ICD-10-CM

## 2024-08-07 DIAGNOSIS — Z11.3 ROUTINE SCREENING FOR STI (SEXUALLY TRANSMITTED INFECTION): ICD-10-CM

## 2024-08-07 PROCEDURE — 1159F MED LIST DOCD IN RCRD: CPT | Mod: CPTII,,, | Performed by: STUDENT IN AN ORGANIZED HEALTH CARE EDUCATION/TRAINING PROGRAM

## 2024-08-07 PROCEDURE — 99999 PR PBB SHADOW E&M-EST. PATIENT-LVL III: CPT | Mod: PBBFAC,,, | Performed by: STUDENT IN AN ORGANIZED HEALTH CARE EDUCATION/TRAINING PROGRAM

## 2024-08-07 PROCEDURE — 3075F SYST BP GE 130 - 139MM HG: CPT | Mod: CPTII,,, | Performed by: STUDENT IN AN ORGANIZED HEALTH CARE EDUCATION/TRAINING PROGRAM

## 2024-08-07 PROCEDURE — 3008F BODY MASS INDEX DOCD: CPT | Mod: CPTII,,, | Performed by: STUDENT IN AN ORGANIZED HEALTH CARE EDUCATION/TRAINING PROGRAM

## 2024-08-07 PROCEDURE — 3078F DIAST BP <80 MM HG: CPT | Mod: CPTII,,, | Performed by: STUDENT IN AN ORGANIZED HEALTH CARE EDUCATION/TRAINING PROGRAM

## 2024-08-07 PROCEDURE — 87591 N.GONORRHOEAE DNA AMP PROB: CPT | Performed by: STUDENT IN AN ORGANIZED HEALTH CARE EDUCATION/TRAINING PROGRAM

## 2024-08-07 PROCEDURE — 99385 PREV VISIT NEW AGE 18-39: CPT | Mod: S$PBB,,, | Performed by: STUDENT IN AN ORGANIZED HEALTH CARE EDUCATION/TRAINING PROGRAM

## 2024-08-07 PROCEDURE — 81514 NFCT DS BV&VAGINITIS DNA ALG: CPT | Performed by: STUDENT IN AN ORGANIZED HEALTH CARE EDUCATION/TRAINING PROGRAM

## 2024-08-07 PROCEDURE — 99213 OFFICE O/P EST LOW 20 MIN: CPT | Mod: PBBFAC,PN | Performed by: STUDENT IN AN ORGANIZED HEALTH CARE EDUCATION/TRAINING PROGRAM

## 2024-08-07 PROCEDURE — 87491 CHLMYD TRACH DNA AMP PROBE: CPT | Performed by: STUDENT IN AN ORGANIZED HEALTH CARE EDUCATION/TRAINING PROGRAM

## 2024-08-08 LAB
BACTERIAL VAGINOSIS DNA: POSITIVE
CANDIDA GLABRATA DNA: NEGATIVE
CANDIDA KRUSEI DNA: NEGATIVE
CANDIDA RRNA VAG QL PROBE: NEGATIVE
T VAGINALIS RRNA GENITAL QL PROBE: NEGATIVE

## 2024-08-09 ENCOUNTER — TELEPHONE (OUTPATIENT)
Dept: OBSTETRICS AND GYNECOLOGY | Facility: CLINIC | Age: 34
End: 2024-08-09
Payer: MEDICAID

## 2024-08-09 LAB
C TRACH DNA SPEC QL NAA+PROBE: NOT DETECTED
N GONORRHOEA DNA SPEC QL NAA+PROBE: NOT DETECTED

## 2024-08-09 RX ORDER — METRONIDAZOLE 500 MG/1
500 TABLET ORAL EVERY 12 HOURS
Qty: 14 TABLET | Refills: 0 | Status: SHIPPED | OUTPATIENT
Start: 2024-08-09 | End: 2024-08-16

## 2024-08-10 LAB
FINAL PATHOLOGIC DIAGNOSIS: NORMAL
Lab: NORMAL

## 2024-08-15 ENCOUNTER — TELEPHONE (OUTPATIENT)
Dept: OBSTETRICS AND GYNECOLOGY | Facility: CLINIC | Age: 34
End: 2024-08-15
Payer: MEDICAID

## 2024-08-15 NOTE — TELEPHONE ENCOUNTER
Notified pt her know her pap smear and HPV testing are negative! She will need another pap smear in 5 years but well woman exam yearly. Pt verbalized understanding

## 2024-08-15 NOTE — TELEPHONE ENCOUNTER
----- Message from Parisa Woods MD sent at 8/14/2024  4:25 PM CDT -----  Please call patient and let her know her pap smear and HPV testing are negative! She will need another pap smear in 5 years but well woman exam yearly. Thanks!

## 2024-08-28 ENCOUNTER — PATIENT MESSAGE (OUTPATIENT)
Facility: CLINIC | Age: 34
End: 2024-08-28
Payer: MEDICAID

## 2024-08-29 ENCOUNTER — PATIENT MESSAGE (OUTPATIENT)
Dept: CARDIOLOGY | Facility: CLINIC | Age: 34
End: 2024-08-29
Payer: MEDICAID

## 2024-09-06 ENCOUNTER — OFFICE VISIT (OUTPATIENT)
Facility: CLINIC | Age: 34
End: 2024-09-06
Payer: MEDICAID

## 2024-09-06 ENCOUNTER — PATIENT MESSAGE (OUTPATIENT)
Facility: CLINIC | Age: 34
End: 2024-09-06

## 2024-09-06 VITALS — WEIGHT: 159.19 LBS | BODY MASS INDEX: 28.2 KG/M2 | DIASTOLIC BLOOD PRESSURE: 84 MMHG | SYSTOLIC BLOOD PRESSURE: 124 MMHG

## 2024-09-06 DIAGNOSIS — N80.9 ENDOMETRIOSIS: Primary | ICD-10-CM

## 2024-09-06 DIAGNOSIS — Z31.41 FERTILITY TESTING: ICD-10-CM

## 2024-09-06 DIAGNOSIS — B96.89 BV (BACTERIAL VAGINOSIS): ICD-10-CM

## 2024-09-06 DIAGNOSIS — R10.2 PELVIC PAIN: ICD-10-CM

## 2024-09-06 DIAGNOSIS — N76.0 BV (BACTERIAL VAGINOSIS): ICD-10-CM

## 2024-09-06 PROCEDURE — 99999 PR PBB SHADOW E&M-EST. PATIENT-LVL III: CPT | Mod: PBBFAC,,, | Performed by: STUDENT IN AN ORGANIZED HEALTH CARE EDUCATION/TRAINING PROGRAM

## 2024-09-06 PROCEDURE — 99213 OFFICE O/P EST LOW 20 MIN: CPT | Mod: PBBFAC,PN | Performed by: STUDENT IN AN ORGANIZED HEALTH CARE EDUCATION/TRAINING PROGRAM

## 2024-09-06 RX ORDER — GABAPENTIN 300 MG/1
300 CAPSULE ORAL 3 TIMES DAILY
Qty: 90 CAPSULE | Refills: 11 | Status: SHIPPED | OUTPATIENT
Start: 2024-09-06 | End: 2025-09-06

## 2024-09-06 RX ORDER — METRONIDAZOLE 500 MG/1
500 TABLET ORAL EVERY 12 HOURS
Qty: 14 TABLET | Refills: 0 | Status: SHIPPED | OUTPATIENT
Start: 2024-09-06 | End: 2024-09-13

## 2024-09-06 RX ORDER — METRONIDAZOLE 500 MG/1
500 TABLET ORAL EVERY 12 HOURS
Qty: 14 TABLET | Refills: 0 | Status: SHIPPED | OUTPATIENT
Start: 2024-09-06 | End: 2024-09-06

## 2024-09-06 RX ORDER — RELUGOLIX, ESTRADIOL HEMIHYDRATE, AND NORETHINDRONE ACETATE 40; 1; .5 MG/1; MG/1; MG/1
1 TABLET, FILM COATED ORAL DAILY
Qty: 90 TABLET | Refills: 3 | Status: SHIPPED | OUTPATIENT
Start: 2024-09-06

## 2024-09-06 NOTE — PROGRESS NOTES
CC: lower abdominal pain    HPI:  Marylee Breon Robinson is a 33 y.o. female  presents with complaint of cramping/sharp pains like she had before with endometriosis. Diagnosis in may 2017 via surgical removal of endometrioma of abdominal wall with Dr. Ortega. At that time, the pain was severe. Now the pain is manageable but worsening so she wants to do something before it gets worse. Does want to have more kids in future. Pain is sharp/stabbing, worse with period, pressure on her abdomen, when she eats. Does have nausea at times as well, hx gastroparesis and T2DM    ROS:  GENERAL: No fever, chills, fatigability or weight loss.  VULVAR: No pain, no lesions and no itching.  VAGINAL: No relaxation, no itching, no discharge, no abnormal bleeding and no lesions.  ABDOMEN: Denies vomiting. No diarrhea. No constipation  BREAST: Denies pain. No lumps. No discharge.  URINARY: No incontinence, no nocturia, no frequency and no dysuria.  CARDIOVASCULAR: No chest pain. No shortness of breath. No leg cramps.  NEUROLOGICAL: No headaches. No vision changes.      Patient History:  Past Medical History:   Diagnosis Date    Diabetes mellitus     Endometriosis     Hyperlipidemia     Hypertension      Past Surgical History:   Procedure Laterality Date     SECTION, CLASSIC      ESOPHAGOGASTRODUODENOSCOPY N/A 2021    Procedure: EGD (ESOPHAGOGASTRODUODENOSCOPY);  Surgeon: Tim Mcneil MD;  [facility];  Service: Endoscopy;  Laterality: N/A;     Social History     Tobacco Use    Smoking status: Former    Smokeless tobacco: Never   Substance Use Topics    Alcohol use: No    Drug use: No     Family History   Problem Relation Name Age of Onset    Diabetes Maternal Grandmother      Hypertension Maternal Grandmother      Diabetes Paternal Grandmother      Hypertension Paternal Grandmother       OB History    Para Term  AB Living   2 2           SAB IAB Ectopic Multiple Live Births                   # Outcome Date GA Lbr Elie/2nd Weight Sex Type Anes PTL Lv   2 Para            1 Para                Objective:   /84   Wt 72.2 kg (159 lb 2.8 oz)   LMP 09/05/2024 (Exact Date)   BMI 28.20 kg/m²   Patient's last menstrual period was 09/05/2024 (exact date).      PHYSICAL EXAM:  APPEARANCE: Well nourished, well developed, in no acute distress.  AFFECT: WNL, alert and oriented x 3  SKIN: No acne or hirsutism  NECK: Neck symmetric without masses or thyromegaly  CHEST: Good respiratory effect  EXTREMITIES: No edema.      ASSESSMENT and PLAN:    ICD-10-CM ICD-9-CM    1. Endometriosis  N80.9 617.9 gabapentin (NEURONTIN) 300 MG capsule      relugolix-estradiol-norethindr (MYFEMBREE) 40-1-0.5 mg Tab      US Pelvis Comp with Transvag NON-OB (xpd      2. BV (bacterial vaginosis)  N76.0 616.10 metroNIDAZOLE (FLAGYL) 500 MG tablet    B96.89 041.9 DISCONTINUED: metroNIDAZOLE (FLAGYL) 500 MG tablet      3. Pelvic pain  R10.2 [id number] gabapentin (NEURONTIN) 300 MG capsule      relugolix-estradiol-norethindr (MYFEMBREE) 40-1-0.5 mg Tab      US Pelvis Comp with Transvag NON-OB (xpd      4. Fertility testing  Z31.41 V26.21 Follicle Stimulating Hormone      Testosterone      Prolactin      ESTRADIOL      ANTIMULLERIAN HORMONE ([facility])          Endometriosis   - reviewed previous records from 2017, surgical report, pathology   - discussed options for medication management of endometriosis, risks and benefits of continuous OCP, orilissa, myfembree, gabapentin, ponstel/meclofen. Patient would like to try myfemrbee and gabapentin, rx sent to pharmacy   - TVUS ordered to update imaging due to new worsening pain   - discussed would recommend avoiding surgery unless necessary  - when ready for pregnancy, stop medications and complete cycle tracking/timed intercourse. Labs ordered today for fertility testing       Follow up: as needed if symptoms worsen or 6 months follow up       Stephanie Heaney, MD OBGYN [facility]

## 2024-11-23 ENCOUNTER — NURSE TRIAGE (OUTPATIENT)
Dept: ADMINISTRATIVE | Facility: CLINIC | Age: 34
End: 2024-11-23
Payer: MEDICAID

## 2024-11-24 NOTE — TELEPHONE ENCOUNTER
Caller taken from [place]. Caller states she was unaware that she was speaking with a nurse on previous call, and has questions about how to obtain her insulin. Caller advised per previous triage call to contact provider's office on Monday when open for refill. Caller has questions about glucose elevating over the weekend without insulin. Caller advised ED, UC, or ODVV and possible alternative solutions.         Reason for Disposition   Caller has already spoken with another triager or PCP AND has further questions AND triager able to answer questions.    Additional Information   Negative: Caller has already spoken with the PCP and has no further questions.   Negative: Caller has already spoken with another triager and has no further questions.    Protocols used: No Contact or Duplicate Contact Call-A-AH

## 2024-11-24 NOTE — TELEPHONE ENCOUNTER
Pt calling with blood glucose of 203. Reports she ran out of insulin this AM. I have triaged her utilizing the hyperglycemia protocol. Advised, per protocol and verbalizes understanding. She will follow up with her provider on Monday for a refill on medication and will call back with any questions/concerns or symptoms in the meantime.    Reason for Disposition   Blood glucose  mg/dL (3.9 -13.3 mmol/L)    Additional Information   Negative: Unconscious or difficult to awaken   Negative: Acting confused (e.g., disoriented, slurred speech)   Negative: Very weak (e.g., can't stand)   Negative: Sounds like a life-threatening emergency to the triager   Negative: [1] Vomiting AND [2] signs of dehydration (e.g., very dry mouth, lightheaded, dark urine)   Negative: [1] Blood glucose > 240 mg/dL (13.3 mmol/L) AND [2] rapid breathing   Negative: Blood glucose > 500 mg/dL (27.8 mmol/L)   Negative: [1] Blood glucose > 240 mg/dL (13.3 mmol/L) AND [2] urine ketones moderate-large (or more than 1+)   Negative: [1] Blood glucose > 240 mg/dL (13.3 mmol/L) AND [2] blood ketones > 1.4 mmol/L   Negative: [1] Blood glucose > 240 mg/dL (13.3 mmol/L) AND [2] vomiting AND [3] unable to check for ketones (in blood or urine)   Negative: [1] New-onset diabetes suspected (e.g., frequent urination, weak, weight loss) AND [2] vomiting or rapid breathing   Negative: Vomiting lasts > 4 hours   Negative: Patient sounds very sick or weak to the triager   Negative: Fever > 100.4 F (38.0 C)   Negative: Blood glucose > 400 mg/dL (22.2 mmol/L)   Negative: [1] Blood glucose > 300 mg/dL (16.7 mmol/L) AND [2] two or more times in a row   Negative: Urine ketones moderate - large (or blood ketones > 1.4 mmol/L)   Negative: [1] Symptoms of high blood sugar (e.g., increased thirst, frequent urination, weight loss) AND [2] not able to test blood glucose AND [3] pregnant   Negative: [1] Caller has URGENT medication or insulin device (e.g., pump, continuous  monitoring) question AND [2] triager unable to answer question   Negative: [1] Blood glucose > 240 mg/dL (13.3 mmol/L) AND [2] pregnant   Negative: [1] Symptoms of high blood sugar (e.g., increased thirst, frequent urination, weight loss) AND [2] not able to test blood glucose   Negative: New-onset diabetes suspected (e.g., increased thirst, frequent urination, weight loss)   Negative: [1] Caller has NON-URGENT medication or insulin device (e.g., pump, continuous monitoring) question AND [2] triager unable to answer question    Protocols used: Diabetes - High Blood Sugar-A-

## 2024-12-31 ENCOUNTER — OFFICE VISIT (OUTPATIENT)
Dept: CARDIOLOGY | Facility: CLINIC | Age: 34
End: 2024-12-31
Payer: MEDICAID

## 2024-12-31 VITALS
SYSTOLIC BLOOD PRESSURE: 100 MMHG | BODY MASS INDEX: 27.29 KG/M2 | HEIGHT: 63 IN | DIASTOLIC BLOOD PRESSURE: 80 MMHG | OXYGEN SATURATION: 98 % | WEIGHT: 154 LBS | HEART RATE: 90 BPM

## 2024-12-31 DIAGNOSIS — R06.02 SOBOE (SHORTNESS OF BREATH ON EXERTION): ICD-10-CM

## 2024-12-31 DIAGNOSIS — I10 ESSENTIAL HYPERTENSION: Primary | ICD-10-CM

## 2024-12-31 DIAGNOSIS — E78.00 HYPERCHOLESTEREMIA: ICD-10-CM

## 2024-12-31 DIAGNOSIS — R07.89 OTHER CHEST PAIN: ICD-10-CM

## 2024-12-31 DIAGNOSIS — Z79.4 TYPE 2 DIABETES MELLITUS WITHOUT COMPLICATION, WITH LONG-TERM CURRENT USE OF INSULIN: ICD-10-CM

## 2024-12-31 DIAGNOSIS — E11.9 TYPE 2 DIABETES MELLITUS WITHOUT COMPLICATION, WITH LONG-TERM CURRENT USE OF INSULIN: ICD-10-CM

## 2024-12-31 PROCEDURE — 3079F DIAST BP 80-89 MM HG: CPT | Mod: CPTII,,,

## 2024-12-31 PROCEDURE — 1159F MED LIST DOCD IN RCRD: CPT | Mod: CPTII,,,

## 2024-12-31 PROCEDURE — 3008F BODY MASS INDEX DOCD: CPT | Mod: CPTII,,,

## 2024-12-31 PROCEDURE — 99999 PR PBB SHADOW E&M-EST. PATIENT-LVL III: CPT | Mod: PBBFAC,,,

## 2024-12-31 PROCEDURE — 1160F RVW MEDS BY RX/DR IN RCRD: CPT | Mod: CPTII,,,

## 2024-12-31 PROCEDURE — 99213 OFFICE O/P EST LOW 20 MIN: CPT | Mod: PBBFAC,PN

## 2024-12-31 PROCEDURE — 99214 OFFICE O/P EST MOD 30 MIN: CPT | Mod: S$PBB,,,

## 2024-12-31 PROCEDURE — 3074F SYST BP LT 130 MM HG: CPT | Mod: CPTII,,,

## 2024-12-31 RX ORDER — AMLODIPINE BESYLATE 10 MG/1
10 TABLET ORAL DAILY
Qty: 90 TABLET | Refills: 3 | Status: SHIPPED | OUTPATIENT
Start: 2024-12-31

## 2024-12-31 RX ORDER — PRAVASTATIN SODIUM 40 MG/1
40 TABLET ORAL NIGHTLY
Qty: 90 TABLET | Refills: 3 | Status: SHIPPED | OUTPATIENT
Start: 2024-12-31

## 2024-12-31 NOTE — PROGRESS NOTES
-Subjective:    Patient ID:  Marylee Breon Robinson is a 34 y.o. female who presents for evaluation of No chief complaint on file.      PCP: No primary care provider on file.     Referring Provider: Miguelina Manley MD    HPI: Patient ios a 33 yo F w/ PMH of HTN,HLD, anxiety, DM-type2, tobacco abuse (1/2 pk/ QOD X 4 yrs), iron deficiency anemia who presents today for f/u appt.She was last seen on 23 for f/u appt and was continued on medical therapy. She was seen prior for abnormal EKG: S-bradycardia HR 51 w incomplete RBBB no ST-T wave abnormalities. Exercise stress test positive. NM stress test negative for ischemia. Cardiac echo w normal LVEF 60%. Patient reports doing well from cardiac standpoint. Reports an admission in [place] on vacation for gastroparesis and received IV hydration.     Patient denies CP, palpitations, orthopnea, PND, pre-syncope, LOC, swelling, or claudication. She notes medication compliance without side effects. She does not monitor her home BP. Patient notes regular exercise and remains active with walking. She notes dietary compliance with low Salt diet.     Past Medical History:   Diagnosis Date    Diabetes mellitus     Endometriosis     Hyperlipidemia     Hypertension      Past Surgical History:   Procedure Laterality Date     SECTION, CLASSIC      ESOPHAGOGASTRODUODENOSCOPY N/A 2021    Procedure: EGD (ESOPHAGOGASTRODUODENOSCOPY);  Surgeon: Tim Mcneil MD;  Location: [facility];  Service: Endoscopy;  Laterality: N/A;     Social History     Socioeconomic History    Marital status:    Tobacco Use    Smoking status: Former     Passive exposure: Never    Smokeless tobacco: Never   Substance and Sexual Activity    Alcohol use: No    Drug use: No    Sexual activity: Yes     Family History   Problem Relation Name Age of Onset    Diabetes Maternal Grandmother      Hypertension Maternal Grandmother      Diabetes Paternal Grandmother      Hypertension Paternal  Grandmother         Review of patient's allergies indicates:   Allergen Reactions    Ibuprofen     Lisinopril Other (See Comments)    Vicodin [hydrocodone-acetaminophen] Hives     Patient states that she can take percocet       Medication List with Changes/Refills   Current Medications    DICLOFENAC (VOLTAREN) 25 MG TBEC    Take 1 tablet (25 mg total) by mouth 3 (three) times daily as needed (pain).    ESCITALOPRAM OXALATE (LEXAPRO) 10 MG TABLET    escitalopram 10 mg tablet   TAKE 1 TABLET BY MOUTH EVERY DAY    FAMOTIDINE (PEPCID) 20 MG TABLET    Take 1 tablet (20 mg total) by mouth every evening.    GABAPENTIN (NEURONTIN) 300 MG CAPSULE    Take 1 capsule (300 mg total) by mouth 3 (three) times daily.    INSULIN (LANTUS SOLOSTAR U-100 INSULIN) GLARGINE 100 UNITS/ML (3ML) SUBQ PEN    Lantus Solostar U-100 Insulin 100 unit/mL (3 mL) subcutaneous pen    INSULIN ASPART PROTAMINE-INSULIN ASPART (NOVOLOG 70/30) 100 UNIT/ML (70-30) INPN PEN    Inject 35 Units into the skin 2 (two) times daily before meals.     ONDANSETRON (ZOFRAN) 8 MG TABLET    Take 1 tablet (8 mg total) by mouth every 12 (twelve) hours as needed for Nausea.    ONDANSETRON (ZOFRAN-ODT) 4 MG TBDL    Take 1 tablet (4 mg total) by mouth every 8 (eight) hours as needed (nausea).    POLYETHYLENE GLYCOL (GLYCOLAX) 17 GRAM PWPK    Take 17 g by mouth daily as needed (constipation).    RELUGOLIX-ESTRADIOL-NORETHINDR (MYFEMBREE) 40-1-0.5 MG TAB    Take 1 tablet by mouth once daily.   Changed and/or Refilled Medications    Modified Medication Previous Medication    AMLODIPINE (NORVASC) 10 MG TABLET amLODIPine (NORVASC) 10 MG tablet       Take 1 tablet (10 mg total) by mouth once daily.    Take 1 tablet (10 mg total) by mouth once daily.    PRAVASTATIN (PRAVACHOL) 40 MG TABLET pravastatin (PRAVACHOL) 40 MG tablet       Take 1 tablet (40 mg total) by mouth every evening.    Take 1 tablet (40 mg total) by mouth every evening.       Review of Systems   Constitutional:  "Negative for diaphoresis and fever.   HENT:  Negative for congestion and hearing loss.    Eyes:  Negative for blurred vision and pain.   Cardiovascular:  Negative for claudication, leg swelling, near-syncope, palpitations and syncope.   Respiratory:  Negative for shortness of breath and sleep disturbances due to breathing.    Hematologic/Lymphatic: Negative for bleeding problem. Does not bruise/bleed easily.   Skin:  Negative for color change and poor wound healing.   Gastrointestinal:  Negative for abdominal pain and nausea.   Genitourinary:  Negative for bladder incontinence and flank pain.   Neurological:  Negative for focal weakness and light-headedness.        Objective:   /80 (BP Location: Left arm, Patient Position: Sitting)   Pulse 90   Ht 5' 3" (1.6 m)   Wt 69.9 kg (154 lb)   LMP 12/22/2024   SpO2 98%   BMI 27.28 kg/m²    Physical Exam  Constitutional:       Appearance: She is well-developed. She is not diaphoretic.   HENT:      Head: Normocephalic and atraumatic.   Eyes:      General: No scleral icterus.     Pupils: Pupils are equal, round, and reactive to light.   Neck:      Vascular: No JVD.   Cardiovascular:      Rate and Rhythm: Normal rate and regular rhythm.      Pulses: Intact distal pulses.           Radial pulses are 2+ on the right side and 2+ on the left side.        Dorsalis pedis pulses are 2+ on the right side and 2+ on the left side.        Posterior tibial pulses are 2+ on the right side and 2+ on the left side.      Heart sounds: S1 normal and S2 normal. No murmur heard.     No friction rub. No gallop.   Pulmonary:      Effort: Pulmonary effort is normal. No respiratory distress.      Breath sounds: Normal breath sounds. No wheezing or rales.   Chest:      Chest wall: No tenderness.   Abdominal:      General: Bowel sounds are normal. There is no distension.      Palpations: Abdomen is soft. There is no mass.      Tenderness: There is no abdominal tenderness. There is no " rebound.   Musculoskeletal:         General: No tenderness. Normal range of motion.      Cervical back: Normal range of motion and neck supple.   Skin:     General: Skin is warm and dry.      Coloration: Skin is not pale.   Neurological:      Mental Status: She is alert and oriented to person, place, and time.      Coordination: Coordination normal.      Deep Tendon Reflexes: Reflexes normal.   Psychiatric:         Behavior: Behavior normal.         Judgment: Judgment normal.       EKG- 1/23/23- S-candy, Premature atrial complexes   Incomplete right bundle branch block     Cardiac echo 2/7/23:  Summary  The left ventricle is normal in size with concentric remodeling and normal systolic function.  The estimated ejection fraction is 60%.  Normal left ventricular diastolic function.  Normal right ventricular size with normal right ventricular systolic function.  Normal central venous pressure (3 mmHg).  The estimated PA systolic pressure is 17 mmHg.    NM Stress test 2/22/23  Conclusion    The ECG portion of the study is negative for ischemia.    The patient reported chest pain during the stress test.    There were no arrhythmias during stress.    The nuclear portion of this study will be reported separately.  Impression:     1. Scintigraphically negative for ischemia or infarct.  2. The global left ventricular systolic function is normal with an LV ejection fraction of 69 % and no evidence of LV dilatation. Wall motion is normal.      Exercise stress test: 2/7/23:   Conclusion    The patient exercised for 9 minutes 31 seconds on a Guillermo protocol, corresponding to a functional capacity of 11 METS, achieving a peak heart rate of 179 bpm, which is 100 % of the age predicted maximum heart rate. The patient experienced no angina during the test. Their exercise capacity was normal. The Duke Treadmill Score was 5.    The ECG portion of the study is positive for ischemia. Specificity is reduced secondary to hypertensive  response.    Specificity is reduced secondary to hypertensive response.    The patient reported no chest pain during the stress test.    The Duke Treadmill Score was 5.    There were no arrhythmias during stress.    The exercise capacity was normal.      Assessment:       1. Essential hypertension    2. Hypercholesteremia    3. SOBOE (shortness of breath on exertion)    4. Other chest pain    5. Type 2 diabetes mellitus without complication, with long-term current use of insulin             Plan:         Essential hypertension  -Goal BP < 130/80  -controlled   -in office 100/80  -continue medication regimen: amlodipine 10 mg  -monitor home BP twice daily and maintain log  -discussed lifestyle modifications and benefits: diet, exercise, weight loss     Hypercholesteremia  -LDL goal < 70  -Last LDL 90 6/18/2021  -continue statin therapy- pravastatin 40 mg      SOBOE (shortness of breath on exertion)  -Cardiac echo 2/7/23-LVEF 60%    Other chest pain  -Treadmill stress test 2/7/23-positive  -repeat NM stress test done-2/22/23-negative     Type 2 diabetes mellitus without complication, with long-term current use of insulin  -last A1c 7.1 11/4/2021  -controlled   -Followed by PCP  -continue medication regimen       Total duration of face to face visit time 30 minutes.  Total time spent counseling greater than fifty percent of total visit time.  Counseling included discussion regarding imaging findings, diagnosis, possibilities, treatment options, risks and benefits.  The patient had many questions regarding the options and long-term effects      Ryan Condon, MAURICE  Cardiology

## 2024-12-31 NOTE — ASSESSMENT & PLAN NOTE
-Goal BP < 130/80  -controlled   -in office 100/80  -continue medication regimen: amlodipine 10 mg  -monitor home BP twice daily and maintain log  -discussed lifestyle modifications and benefits: diet, exercise, weight loss

## (undated) DEVICE — GAUZE SPONGE 4X4 12PLY

## (undated) DEVICE — ELECTRODE REM PLYHSV RETURN 9

## (undated) DEVICE — DRESSING TEGADERM CHG 4X4.5

## (undated) DEVICE — SEE MEDLINE ITEM 156955

## (undated) DEVICE — PACK BASIC

## (undated) DEVICE — SUT PROLENE 1 CTX 30IN BLUE

## (undated) DEVICE — SEE MEDLINE ITEM 157117

## (undated) DEVICE — SEE MEDLINE ITEM 157148

## (undated) DEVICE — SYR B-D DISP CONTROL 10CC100/C

## (undated) DEVICE — GLOVE BIOGEL ECLIPSE SZ 7.5

## (undated) DEVICE — MANIFOLD 4 PORT

## (undated) DEVICE — PAD ABDOMINAL 5X9 STERILE

## (undated) DEVICE — DRESSING XEROFORM FOIL PK 1X8

## (undated) DEVICE — DRESSING TEGASORB THIN 4X4 3/4

## (undated) DEVICE — SUT ETHILON 4-0 PS2 18 BLK

## (undated) DEVICE — SEE MEDLINE ITEM 157116

## (undated) DEVICE — SUT 3-0 VICRYL / SH (J416)

## (undated) DEVICE — APPLICATOR CHLORAPREP ORN 26ML

## (undated) DEVICE — COVER OVERHEAD SURG LT BLUE

## (undated) DEVICE — NDL HYPO A BEVEL 22X1 1/2